# Patient Record
Sex: MALE | Race: WHITE | NOT HISPANIC OR LATINO | Employment: OTHER | URBAN - METROPOLITAN AREA
[De-identification: names, ages, dates, MRNs, and addresses within clinical notes are randomized per-mention and may not be internally consistent; named-entity substitution may affect disease eponyms.]

---

## 2017-01-23 ENCOUNTER — TRANSCRIBE ORDERS (OUTPATIENT)
Dept: ADMINISTRATIVE | Facility: HOSPITAL | Age: 77
End: 2017-01-23

## 2017-01-23 ENCOUNTER — LAB (OUTPATIENT)
Dept: LAB | Facility: HOSPITAL | Age: 77
End: 2017-01-23
Attending: INTERNAL MEDICINE

## 2017-01-23 ENCOUNTER — LAB (OUTPATIENT)
Dept: LAB | Facility: HOSPITAL | Age: 77
End: 2017-01-23

## 2017-01-23 DIAGNOSIS — E11.9 DIABETES MELLITUS WITHOUT COMPLICATION (HCC): ICD-10-CM

## 2017-01-23 DIAGNOSIS — E29.0 TESTICULAR HYPERFUNCTION: ICD-10-CM

## 2017-01-23 DIAGNOSIS — I10 ESSENTIAL HYPERTENSION, MALIGNANT: Primary | ICD-10-CM

## 2017-01-23 DIAGNOSIS — E78.5 HYPERLIPIDEMIA, UNSPECIFIED HYPERLIPIDEMIA TYPE: ICD-10-CM

## 2017-01-23 DIAGNOSIS — I10 ESSENTIAL HYPERTENSION, MALIGNANT: ICD-10-CM

## 2017-01-23 DIAGNOSIS — E03.9 UNSPECIFIED HYPOTHYROIDISM: ICD-10-CM

## 2017-01-23 DIAGNOSIS — G47.30 SLEEP APNEA, UNSPECIFIED TYPE: ICD-10-CM

## 2017-01-23 DIAGNOSIS — G47.30 SLEEP APNEA, UNSPECIFIED TYPE: Primary | ICD-10-CM

## 2017-01-23 LAB
25(OH)D3 SERPL-MCNC: 31.7 NG/ML (ref 30–100)
ALBUMIN SERPL-MCNC: 4.1 G/DL (ref 3.5–5.2)
ALBUMIN/GLOB SERPL: 1.2 G/DL
ALP SERPL-CCNC: 60 U/L (ref 39–117)
ALT SERPL W P-5'-P-CCNC: 24 U/L (ref 1–41)
ANION GAP SERPL CALCULATED.3IONS-SCNC: 13.3 MMOL/L
AST SERPL-CCNC: 31 U/L (ref 1–40)
BASOPHILS # BLD AUTO: 0.06 10*3/MM3 (ref 0–0.2)
BASOPHILS NFR BLD AUTO: 0.6 % (ref 0–1.5)
BILIRUB SERPL-MCNC: 1.1 MG/DL (ref 0.1–1.2)
BUN BLD-MCNC: 46 MG/DL (ref 8–23)
BUN/CREAT SERPL: 26.1 (ref 7–25)
CALCIUM SPEC-SCNC: 9.4 MG/DL (ref 8.6–10.5)
CHLORIDE SERPL-SCNC: 100 MMOL/L (ref 98–107)
CHOLEST SERPL-MCNC: 139 MG/DL (ref 0–200)
CO2 SERPL-SCNC: 26.7 MMOL/L (ref 22–29)
CREAT BLD-MCNC: 1.76 MG/DL (ref 0.76–1.27)
CREAT UR-MCNC: 48.6 MG/DL
DEPRECATED RDW RBC AUTO: 57.2 FL (ref 37–54)
EOSINOPHIL # BLD AUTO: 0.34 10*3/MM3 (ref 0–0.7)
EOSINOPHIL NFR BLD AUTO: 3.3 % (ref 0.3–6.2)
ERYTHROCYTE [DISTWIDTH] IN BLOOD BY AUTOMATED COUNT: 15.2 % (ref 11.5–14.5)
GFR SERPL CREATININE-BSD FRML MDRD: 38 ML/MIN/1.73
GLOBULIN UR ELPH-MCNC: 3.4 GM/DL
GLUCOSE BLD-MCNC: 237 MG/DL (ref 65–99)
HBA1C MFR BLD: 8.46 % (ref 4.8–5.6)
HCT VFR BLD AUTO: 48.9 % (ref 40.4–52.2)
HDLC SERPL-MCNC: 38 MG/DL (ref 40–60)
HGB BLD-MCNC: 15.1 G/DL (ref 13.7–17.6)
IMM GRANULOCYTES # BLD: 0.05 10*3/MM3 (ref 0–0.03)
IMM GRANULOCYTES NFR BLD: 0.5 % (ref 0–0.5)
LDLC SERPL CALC-MCNC: 69 MG/DL (ref 0–100)
LDLC/HDLC SERPL: 1.81 {RATIO}
LYMPHOCYTES # BLD AUTO: 1.2 10*3/MM3 (ref 0.9–4.8)
LYMPHOCYTES NFR BLD AUTO: 11.7 % (ref 19.6–45.3)
MCH RBC QN AUTO: 31.5 PG (ref 27–32.7)
MCHC RBC AUTO-ENTMCNC: 30.9 G/DL (ref 32.6–36.4)
MCV RBC AUTO: 101.9 FL (ref 79.8–96.2)
MONOCYTES # BLD AUTO: 0.89 10*3/MM3 (ref 0.2–1.2)
MONOCYTES NFR BLD AUTO: 8.7 % (ref 5–12)
NEUTROPHILS # BLD AUTO: 7.74 10*3/MM3 (ref 1.9–8.1)
NEUTROPHILS NFR BLD AUTO: 75.2 % (ref 42.7–76)
PHOSPHATE SERPL-MCNC: 4 MG/DL (ref 2.5–4.5)
PLATELET # BLD AUTO: 214 10*3/MM3 (ref 140–500)
PMV BLD AUTO: 10.1 FL (ref 6–12)
POTASSIUM BLD-SCNC: 5.2 MMOL/L (ref 3.5–5.2)
PROT SERPL-MCNC: 7.5 G/DL (ref 6–8.5)
PROT UR-MCNC: 9 MG/DL
PROT/CREAT UR: 185.2 MG/G CREA
RBC # BLD AUTO: 4.8 10*6/MM3 (ref 4.6–6)
SODIUM BLD-SCNC: 140 MMOL/L (ref 136–145)
TESTOST SERPL-MCNC: 152.7 NG/DL (ref 193–740)
TRIGL SERPL-MCNC: 161 MG/DL (ref 0–150)
TSH SERPL DL<=0.05 MIU/L-ACNC: 3.35 MIU/ML (ref 0.27–4.2)
VLDLC SERPL-MCNC: 32.2 MG/DL (ref 5–40)
WBC NRBC COR # BLD: 10.28 10*3/MM3 (ref 4.5–10.7)

## 2017-01-23 PROCEDURE — 84100 ASSAY OF PHOSPHORUS: CPT | Performed by: INTERNAL MEDICINE

## 2017-01-23 PROCEDURE — 84443 ASSAY THYROID STIM HORMONE: CPT | Performed by: INTERNAL MEDICINE

## 2017-01-23 PROCEDURE — 36415 COLL VENOUS BLD VENIPUNCTURE: CPT

## 2017-01-23 PROCEDURE — 82570 ASSAY OF URINE CREATININE: CPT | Performed by: INTERNAL MEDICINE

## 2017-01-23 PROCEDURE — 82306 VITAMIN D 25 HYDROXY: CPT | Performed by: INTERNAL MEDICINE

## 2017-01-23 PROCEDURE — 85025 COMPLETE CBC W/AUTO DIFF WBC: CPT | Performed by: INTERNAL MEDICINE

## 2017-01-23 PROCEDURE — 83036 HEMOGLOBIN GLYCOSYLATED A1C: CPT | Performed by: INTERNAL MEDICINE

## 2017-01-23 PROCEDURE — 80053 COMPREHEN METABOLIC PANEL: CPT | Performed by: INTERNAL MEDICINE

## 2017-01-23 PROCEDURE — 80061 LIPID PANEL: CPT | Performed by: INTERNAL MEDICINE

## 2017-01-23 PROCEDURE — 84403 ASSAY OF TOTAL TESTOSTERONE: CPT | Performed by: INTERNAL MEDICINE

## 2017-01-23 PROCEDURE — 84156 ASSAY OF PROTEIN URINE: CPT | Performed by: INTERNAL MEDICINE

## 2017-01-30 ENCOUNTER — OFFICE VISIT (OUTPATIENT)
Dept: NEUROSURGERY | Facility: CLINIC | Age: 77
End: 2017-01-30

## 2017-01-30 VITALS
HEIGHT: 71 IN | WEIGHT: 197 LBS | DIASTOLIC BLOOD PRESSURE: 79 MMHG | HEART RATE: 87 BPM | BODY MASS INDEX: 27.58 KG/M2 | SYSTOLIC BLOOD PRESSURE: 122 MMHG

## 2017-01-30 DIAGNOSIS — M48.061 STENOSIS, SPINAL, LUMBAR: Primary | ICD-10-CM

## 2017-01-30 DIAGNOSIS — M51.36 DEGENERATION OF LUMBAR INTERVERTEBRAL DISC: ICD-10-CM

## 2017-01-30 PROCEDURE — 99213 OFFICE O/P EST LOW 20 MIN: CPT | Performed by: NEUROLOGICAL SURGERY

## 2017-01-30 NOTE — PROGRESS NOTES
Subjective   Patient ID: Eugenio Vargas is a 76 y.o. male is here today for follow-up. Patient has been having increasing back pain. He would like to have some epidurals. He is not currently taking any pain medication.    Back Pain   This is a recurrent problem. The current episode started more than 1 year ago. The problem occurs constantly. The problem has been gradually worsening since onset. The pain is present in the lumbar spine. The quality of the pain is described as aching. The pain radiates to the left knee, left thigh, right knee and right thigh. The pain is at a severity of 3/10. The pain is mild. The pain is the same all the time. The symptoms are aggravated by bending and standing. Associated symptoms include leg pain and weakness. Pertinent negatives include no bladder incontinence, bowel incontinence, numbness or tingling. Treatments tried: HANNAH. The treatment provided significant relief.       The following portions of the patient's history were reviewed and updated as appropriate: allergies, current medications, past family history, past medical history, past social history, past surgical history and problem list.    Review of Systems   Constitutional: Positive for activity change.   Gastrointestinal: Negative for bowel incontinence.   Genitourinary: Negative for bladder incontinence.   Musculoskeletal: Positive for back pain.   Neurological: Positive for weakness. Negative for tingling and numbness.   All other systems reviewed and are negative.    He is with his wife.  I have not seen him for about 15 or 16 months.  He has known spinal stenosis with low back pain and right leg pain.  The last set of blocks helped him.  The blocks have worn off.  His pain has flared up.  He is on aspirin.  He wants to try the blocks again.  His symptoms are consistent.  He has no motor deficits.  I think it's reasonable to reorder the blocks without reimaging him.  We'll go ahead and set that  out.        Objective   Physical Exam   Constitutional: He is oriented to person, place, and time. He appears well-developed and well-nourished.   HENT:   Head: Normocephalic and atraumatic.   Eyes: Conjunctivae and EOM are normal. Pupils are equal, round, and reactive to light.   Fundoscopic exam:       The right eye shows no papilledema. The right eye shows venous pulsations.        The left eye shows no papilledema. The left eye shows venous pulsations.   Neck: Carotid bruit is not present.   Neurological: He is oriented to person, place, and time. He has a normal Finger-Nose-Finger Test and a normal Heel to Shin Test. Gait normal.   Reflex Scores:       Tricep reflexes are 2+ on the right side and 2+ on the left side.       Bicep reflexes are 2+ on the right side and 2+ on the left side.       Brachioradialis reflexes are 2+ on the right side and 2+ on the left side.       Patellar reflexes are 2+ on the right side and 2+ on the left side.       Achilles reflexes are 2+ on the right side and 2+ on the left side.  Psychiatric: His speech is normal.     Neurologic Exam     Mental Status   Oriented to person, place, and time.   Registration of memory: Good recent and remote memory.   Attention: normal. Concentration: normal.   Speech: speech is normal   Level of consciousness: alert  Knowledge: consistent with education.     Cranial Nerves     CN II   Visual fields full to confrontation.   Visual acuity: normal    CN III, IV, VI   Pupils are equal, round, and reactive to light.  Extraocular motions are normal.     CN V   Facial sensation intact.   Right corneal reflex: normal  Left corneal reflex: normal    CN VII   Facial expression full, symmetric.   Right facial weakness: none  Left facial weakness: none    CN VIII   Hearing: intact    CN IX, X   Palate: symmetric    CN XI   Right sternocleidomastoid strength: normal  Left sternocleidomastoid strength: normal    CN XII   Tongue: not atrophic  Tongue deviation:  none    Motor Exam   Muscle bulk: normal  Right arm tone: normal  Left arm tone: normal  Right leg tone: normal  Left leg tone: normal    Strength   Strength 5/5 except as noted.     Sensory Exam   Light touch normal.     Gait, Coordination, and Reflexes     Gait  Gait: normal    Coordination   Finger to nose coordination: normal  Heel to shin coordination: normal    Reflexes   Right brachioradialis: 2+  Left brachioradialis: 2+  Right biceps: 2+  Left biceps: 2+  Right triceps: 2+  Left triceps: 2+  Right patellar: 2+  Left patellar: 2+  Right achilles: 2+  Left achilles: 2+  Right : 2+  Left : 2+      Assessment/Plan   Independent Review of Radiographic Studies:      Medical Decision Making:    We'll go ahead and repeat blocks again and have him come back in 4 months.  If these don't work, we'll need to obtain a new MRI and regroup.      Eugenio was seen today for back pain.    Diagnoses and all orders for this visit:    Stenosis, spinal, lumbar  -     Epidural Block    Degeneration of lumbar intervertebral disc  -     Epidural Block    Return in about 4 months (around 5/30/2017).

## 2017-01-30 NOTE — MR AVS SNAPSHOT
Eugenio Vargas   1/30/2017 12:00 PM   Office Visit    Dept Phone:  [phone]   Encounter #:  [id number]    Provider:  Jose M Gutierrez MD   Department:  [facility] NEUROSURGERY                Your Full Care Plan              Today's Medication Changes          These changes are accurate as of: 1/30/17  2:23 PM.  If you have any questions, ask your nurse or doctor.               Medication(s)that have changed:     acyclovir 800 MG tablet   Commonly known as:  ZOVIRAX   TK 1 T PO  FIVE TIMES DAILY FOR 7 DAYS   What changed:  Another medication with the same name was removed. Continue taking this medication, and follow the directions you see here.   Changed by:  Lalitha Brandon MD       ALDACTONE 25 MG tablet   Generic drug:  spironolactone   Take 25 mg by mouth.   What changed:  Another medication with the same name was removed. Continue taking this medication, and follow the directions you see here.   Changed by:  Lalitha Brandon MD       ALPRAZolam 0.25 MG tablet   Commonly known as:  XANAX   Daily.   What changed:  Another medication with the same name was removed. Continue taking this medication, and follow the directions you see here.   Changed by:  Lalitha Brandon MD       amiodarone 200 MG tablet   Commonly known as:  PACERONE   Take 200 mg by mouth.   What changed:  Another medication with the same name was removed. Continue taking this medication, and follow the directions you see here.   Changed by:  Lalitha Brandon MD       ANORO ELLIPTA 62.5-25 MCG/INH aerosol powder    Generic drug:  Umeclidinium-Vilanterol   TAKE 1 INHALATION D   What changed:  Another medication with the same name was removed. Continue taking this medication, and follow the directions you see here.   Changed by:  Lalitha Brandon MD       aspirin 81 MG EC tablet   Take  by mouth daily.   What changed:  Another medication with the same name was removed. Continue taking  this medication, and follow the directions you see here.   Changed by:  Lalitha Brandon MD       carvedilol 12.5 MG tablet   Commonly known as:  COREG   TAKE ONE AND ONE-HALF TABLETS BY MOUTH TWICE DAILY AS DIRECTED   What changed:  Another medication with the same name was removed. Continue taking this medication, and follow the directions you see here.   Changed by:  Lalitha Brandon MD       levothyroxine 100 MCG tablet   Commonly known as:  SYNTHROID, LEVOTHROID   Take 100 mcg by mouth.   What changed:  Another medication with the same name was removed. Continue taking this medication, and follow the directions you see here.   Changed by:  Lalitha Brandon MD       torsemide 20 MG tablet   Commonly known as:  DEMADEX   Take 20 mg by mouth.   What changed:  Another medication with the same name was removed. Continue taking this medication, and follow the directions you see here.   Changed by:  Lalitha Brandon MD         Stop taking medication(s)listed here:     cephalexin 500 MG capsule   Commonly known as:  KEFLEX   Stopped by:  Jose M Gutierrez MD           CITRACAL PLUS tablet   Stopped by:  Jose M Gutierrez MD           fish oil 1000 MG capsule capsule   Stopped by:  Jose M Gutierrez MD           HYDROcodone-acetaminophen 7.5-325 MG per tablet   Commonly known as:  NORCO   Stopped by:  Jose M Gutierrez MD           isosorbide dinitrate 20 MG tablet 1 tablet, hydrALAZINE 25 MG tablet 1.5 tablet   Stopped by:  Jose M Gutierrez MD           levoFLOXacin 500 MG tablet   Commonly known as:  LEVAQUIN   Stopped by:  Jose M Gutierrez MD           lidocaine 2 % jelly   Commonly known as:  XYLOCAINE   Stopped by:  Jose M Gutierrez MD           predniSONE 20 MG tablet   Commonly known as:  DELTASONE   Stopped by:  Jose M Gutierrez MD                      Your Updated Medication List          This list is accurate as of: 1/30/17  2:23 PM.  Always use your most recent med list.                 acyclovir 800 MG tablet   Commonly known as:  ZOVIRAX       ALDACTONE 25 MG tablet   Generic drug:  spironolactone       ALPRAZolam 0.25 MG tablet   Commonly known as:  XANAX       amiodarone 200 MG tablet   Commonly known as:  PACERONE       ANORO ELLIPTA 62.5-25 MCG/INH aerosol powder    Generic drug:  Umeclidinium-Vilanterol       aspirin 81 MG EC tablet       BIDIL 20-37.5 MG per tablet   Generic drug:  isosorbide-hydrALAZINE       carvedilol 12.5 MG tablet   Commonly known as:  COREG       insulin  UNIT/ML injection   Commonly known as:  humuLIN N,novoLIN N       levothyroxine 100 MCG tablet   Commonly known as:  SYNTHROID, LEVOTHROID       Memantine HCl-Donepezil HCl 28-10 MG capsule sustained-release 24 hr   Take 1 capsule by mouth Every Night for 90 days.       multivitamin tablet       rosuvastatin 10 MG tablet   Commonly known as:  CRESTOR       Testosterone Cypionate 200 MG/ML injection   Commonly known as:  DEPOTESTOTERONE CYPIONATE       torsemide 20 MG tablet   Commonly known as:  DEMADEX               We Performed the Following     Epidural Block       You Were Diagnosed With        Codes Comments    Stenosis, spinal, lumbar    -  Primary ICD-10-CM: M48.06  ICD-9-CM: 724.02     Degeneration of lumbar intervertebral disc     ICD-10-CM: M51.36  ICD-9-CM: 722.52       Instructions     None    Patient Instructions History      Upcoming Appointments     Visit Type Date Time Department    FOLLOW UP 1/30/2017 12:00 PM [facility]    FOLLOW UP 5/22/2017 10:00 AM [facility]    FOLLOW UP 6/2/2017  9:00 AM [facility]      [employer] Signup     [facility] [employer] allows you to send messages to your doctor, view your test results, renew your prescriptions, schedule appointments, and more. To sign up, go to [employer] and click on the Sign Up Now link in the New User? box. Enter your [employer] Activation Code exactly as it appears below along with the last four  "digits of your Social Security Number and your Date of Birth () to complete the sign-up process. If you do not sign up before the expiration date, you must request a new code.    [employer] Activation Code: [email or web address]  Expires: 2017  2:23 PM    If you have questions, you can email [email or web address] or call [phone] to talk to our [employer] staff. Remember, [employer] is NOT to be used for urgent needs. For medical emergencies, dial 911.               Other Info from Your Visit           Your Appointments     May 22, 2017 10:00 AM EDT   Follow Up with Lalitha Brandon MD   [facility] NEUROLOGY (--)    [address]  [place] [facility] [address] [phone]           Arrive 15 minutes prior to appointment.            2017  9:00 AM EDT   Follow Up with Jose M Gutierrez MD   [facility] NEUROSURGERY (--)    [address]  [facility] [address] [phone]           Arrive 15 minutes prior to appointment.              Allergies     Lorazepam        Reason for Visit     Back Pain           Vital Signs     Blood Pressure Pulse Height Weight Body Mass Index Smoking Status    122/79 87 71\" (180.3 cm) 197 lb (89.4 kg) 27.48 kg/m2 Former Smoker      Problems and Diagnoses Noted     Degeneration of lumbar or lumbosacral intervertebral disc    Stenosis, spinal, lumbar        "

## 2017-01-30 NOTE — LETTER
January 30, 2017     Juan Hurd MD  [address]  [address]  [facility] [place] [address]    Patient: Eugenio Vargas   YOB: 1940   Date of Visit: 1/30/2017       Dear Dr. Vickey MD:    Eugenio Vargas was in my office today. Below are the relevant portions of my assessment and plan of care.      Independent Review of Radiographic Studies:      Medical Decision Making:    We'll go ahead and repeat blocks again and have him come back in 4 months.  If these don't work, we'll need to obtain a new MRI and regroup.      Eugenio was seen today for back pain.    Diagnoses and all orders for this visit:    Stenosis, spinal, lumbar  -     Epidural Block    Degeneration of lumbar intervertebral disc  -     Epidural Block    Return in about 4 months (around 5/30/2017).            If you have questions, please do not hesitate to call me. I look forward to following Eugenio along with you.         Sincerely,        Jose M Gutierrez MD        CC: No Recipients

## 2017-02-10 ENCOUNTER — TELEPHONE (OUTPATIENT)
Dept: NEUROLOGY | Facility: CLINIC | Age: 77
End: 2017-02-10

## 2017-02-10 RX ORDER — DONEPEZIL HYDROCHLORIDE 10 MG/1
10 TABLET, FILM COATED ORAL NIGHTLY
Qty: 90 TABLET | Refills: 3 | Status: SHIPPED | OUTPATIENT
Start: 2017-02-10 | End: 2017-05-11

## 2017-02-10 NOTE — TELEPHONE ENCOUNTER
Namzaric is going to cost pt $700 for 1 month. Pt is needing a RX for Donepezil so he can go back to taking both and Namenda. Has enough Namenda until next appt.

## 2017-02-16 ENCOUNTER — HOSPITAL ENCOUNTER (OUTPATIENT)
Dept: PAIN MEDICINE | Facility: HOSPITAL | Age: 77
Discharge: HOME | End: 2017-02-16
Attending: NEUROLOGICAL SURGERY | Admitting: NEUROLOGICAL SURGERY

## 2017-02-16 ENCOUNTER — ANESTHESIA (OUTPATIENT)
Dept: PAIN MEDICINE | Facility: HOSPITAL | Age: 77
End: 2017-02-16

## 2017-02-16 ENCOUNTER — ANESTHESIA EVENT (OUTPATIENT)
Dept: PAIN MEDICINE | Facility: HOSPITAL | Age: 77
End: 2017-02-16

## 2017-02-16 ENCOUNTER — HOSPITAL ENCOUNTER (OUTPATIENT)
Dept: GENERAL RADIOLOGY | Facility: HOSPITAL | Age: 77
Discharge: HOME | End: 2017-02-16

## 2017-02-16 VITALS
OXYGEN SATURATION: 96 % | SYSTOLIC BLOOD PRESSURE: 122 MMHG | HEIGHT: 70 IN | RESPIRATION RATE: 16 BRPM | HEART RATE: 63 BPM | WEIGHT: 189 LBS | BODY MASS INDEX: 27.06 KG/M2 | TEMPERATURE: 97.8 F | DIASTOLIC BLOOD PRESSURE: 63 MMHG

## 2017-02-16 DIAGNOSIS — R52 PAIN: ICD-10-CM

## 2017-02-16 DIAGNOSIS — M48.061 STENOSIS, SPINAL, LUMBAR: ICD-10-CM

## 2017-02-16 DIAGNOSIS — M51.36 DEGENERATION OF LUMBAR INTERVERTEBRAL DISC: ICD-10-CM

## 2017-02-16 LAB — GLUCOSE BLDC GLUCOMTR-MCNC: 164 MG/DL (ref 70–130)

## 2017-02-16 PROCEDURE — 77003 FLUOROGUIDE FOR SPINE INJECT: CPT

## 2017-02-16 PROCEDURE — 82962 GLUCOSE BLOOD TEST: CPT

## 2017-02-16 PROCEDURE — C1755 CATHETER, INTRASPINAL: HCPCS

## 2017-02-16 PROCEDURE — 25010000002 METHYLPREDNISOLONE PER 80 MG: Performed by: ANESTHESIOLOGY

## 2017-02-16 RX ORDER — METHYLPREDNISOLONE ACETATE 80 MG/ML
80 INJECTION, SUSPENSION INTRA-ARTICULAR; INTRALESIONAL; INTRAMUSCULAR; SOFT TISSUE ONCE
Status: COMPLETED | OUTPATIENT
Start: 2017-02-16 | End: 2017-02-16

## 2017-02-16 RX ORDER — LIDOCAINE HYDROCHLORIDE 10 MG/ML
1 INJECTION, SOLUTION INFILTRATION; PERINEURAL ONCE
Status: DISCONTINUED | OUTPATIENT
Start: 2017-02-16 | End: 2017-02-17 | Stop reason: HOSPADM

## 2017-02-16 RX ADMIN — METHYLPREDNISOLONE ACETATE 80 MG: 80 INJECTION, SUSPENSION INTRA-ARTICULAR; INTRALESIONAL; INTRAMUSCULAR; SOFT TISSUE at 10:28

## 2017-02-16 NOTE — ANESTHESIA PROCEDURE NOTES
PAIN Epidural block    Patient location during procedure: pain clinic  Indication:procedure for pain  Performed By  Anesthesiologist: BALAJI SEQUEIRA  Preanesthetic Checklist  Completed: patient identified, site marked, surgical consent, pre-op evaluation, timeout performed, IV checked, risks and benefits discussed and monitors and equipment checked  Additional Notes  LSS/LRAD WITH FLUORO, NO DYE DUE TO KIDNEY FUNCTION  Epidural Block Prep:  Pt Position:prone  Sterile Tech:cap, gloves, mask and sterile barrier  Monitoring:blood pressure monitoring, continuous pulse oximetry and EKG  Epidural Block Procedure:  Approach:right paramedian  Guidance: fluoroscopy  Location:lumbar  Level:4-5  Needle Type:Tuohy  Needle Gauge:20  Aspiration:negative  Medications:  Depomedrol:80  Preservative Free Saline:3mL    Post Assessment:  Dressing:secured with tape  Pt Tolerance:patient tolerated the procedure well with no apparent complications  Complications:no

## 2017-02-16 NOTE — H&P
[facility]    History and Physical    Patient Name: Eugenio Vargas  :  1940  MRN:  [id number]  Date of Admission: 2017    Subjective     Patient is a 77 y.o. male presents with chief complaint of chronic low back pain.  Onset of symptoms was gradual starting several years ago.  Symptoms are associated/aggravated by standing. Symptoms improve with lying down.  Pain 7/10 radiate to hip.    The following portions of the patients history were reviewed and updated as appropriate: current medications, allergies, past medical history, past surgical history, past family history, past social history and problem list                Objective     Past Medical History:   Past Medical History   Diagnosis Date   • Anxiety    • Arthritis    • Atrial tachycardia, paroxysmal    • CAD (coronary artery disease)    • CHF (congestive heart failure)    • CKD (chronic kidney disease)    • COPD (chronic obstructive pulmonary disease)    • DDD (degenerative disc disease), lumbar    • Diabetes mellitus    • History of positive PPD      pt states positive reactor, but never diagnosed/treated for TB   • HLD (hyperlipidemia)    • HTN (hypertension)    • Hypothyroid    • Ischemic cardiomyopathy    • Mitral insufficiency    • SASHA (obstructive sleep apnea)    • PAF (paroxysmal atrial fibrillation)    • Spinal stenosis    • Stroke      STROKE OCCURED DURING CARDIAC SURGERY IN    • V-tach      Paroxysmal     Past Surgical History:   Past Surgical History   Procedure Laterality Date   • Shoulder surgery     • Sinus surgery     • Mitral valve annuloplasty     • Coronary angioplasty with stent placement     • Cholecystectomy     • Coronary artery bypass graft     • Eye surgery       Family History:   Family History   Problem Relation Age of Onset   • Family history unknown: Yes     Social History:   Social History   Substance Use Topics   • Smoking status: Former Smoker     Packs/day: 1.00   • Smokeless tobacco: Never Used       "Comment: PT QUIT 45 YEARS AGO   • Alcohol use No       Vital Signs Range for the last 24 hours  Temperature: Temp:  [36.6 °C (97.8 °F)] 36.6 °C (97.8 °F)   Temp Source: Temp src: Oral   BP: BP: (134)/(91) 134/91   Pulse: Heart Rate:  [82] 82   Respirations: Resp:  [16] 16   SPO2: SpO2:  [95 %] 95 %   O2 Amount (l/min):     O2 Devices O2 Device: room air   Weight: Weight:  [189 lb (85.7 kg)] 189 lb (85.7 kg)     Flowsheet Rows         First Filed Value    Admission Height  70\" (177.8 cm) Documented at 02/16/2017 1001    Admission Weight  189 lb (85.7 kg) Documented at 02/16/2017 1001          --------------------------------------------------------------------------------    Current Outpatient Prescriptions   Medication Sig Dispense Refill   • ALPRAZolam (XANAX) 0.25 MG tablet Daily.     • amiodarone (PACERONE) 200 MG tablet Take 200 mg by mouth.     • ANORO ELLIPTA 62.5-25 MCG/INH aerosol powder  TAKE 1 INHALATION D  5   • aspirin 81 MG EC tablet Take  by mouth daily.     • carvedilol (COREG) 12.5 MG tablet TAKE ONE AND ONE-HALF TABLETS BY MOUTH TWICE DAILY AS DIRECTED     • donepezil (ARICEPT) 10 MG tablet Take 1 tablet by mouth Every Night for 90 days. 90 tablet 3   • insulin NPH (HumuLIN,NovoLIN) 100 UNIT/ML injection Inject 14 Units under the skin 2 (two) times a day before meals.     • levothyroxine (SYNTHROID, LEVOTHROID) 100 MCG tablet Take 100 mcg by mouth.     • Multiple Vitamin (MULTIVITAMIN) tablet Take 1 tablet by mouth.     • rosuvastatin (CRESTOR) 10 MG tablet Take  by mouth.     • spironolactone (ALDACTONE) 25 MG tablet Take 25 mg by mouth.     • Testosterone Cypionate (DEPOTESTOTERONE CYPIONATE) 200 MG/ML injection 200 mg.     • torsemide (DEMADEX) 20 MG tablet Take 20 mg by mouth.     • BIDIL 20-37.5 MG per tablet TK 1/2 T PO BID  4     No current facility-administered medications for this encounter.  "       --------------------------------------------------------------------------------  Assessment/Plan      Anesthesia Evaluation     Patient summary reviewed and Nursing notes reviewed      Airway   Mallampati: II  TM distance: >3 FB  Neck ROM: full  no difficulty expected  Dental - normal exam     Pulmonary     breath sounds clear to auscultation  (+) COPD, sleep apnea,   Cardiovascular - normal exam  Exercise tolerance: good (4-7 METS)    Rhythm: regular  Rate: normal    (+) hypertension, past MI , CAD, CABG, dysrhythmias Atrial Fib, CHF,       Neuro/Psych- neuro exam normal  (+) CVA, psychiatric history Anxiety,    GI/Hepatic/Renal/Endo    (+)  chronic renal disease CRI, diabetes mellitus well controlled, hypothyroidism,     Musculoskeletal (-) normal exam    (+) back pain,   Abdominal  - normal exam   Substance History - negative use     OB/GYN          Other   (+) arthritis                            Diagnosis and Plan    Treatment Plan  ASA 3      Procedures: Lumbar Epidural Steroid Injection(LESI), With fluoroscopy,       Anesthetic plan and risks discussed with patient.          Diagnosis     * Lumbar spinal stenosis [M48.06]     * Lumbar degenerative disc disease [M51.36]

## 2017-02-28 ENCOUNTER — HOSPITAL ENCOUNTER (OUTPATIENT)
Facility: HOSPITAL | Age: 77
Setting detail: OBSERVATION
Discharge: HOME | End: 2017-03-01
Attending: EMERGENCY MEDICINE | Admitting: INTERNAL MEDICINE

## 2017-02-28 ENCOUNTER — APPOINTMENT (OUTPATIENT)
Dept: CT IMAGING | Facility: HOSPITAL | Age: 77
End: 2017-02-28

## 2017-02-28 DIAGNOSIS — R74.8 ELEVATED LIPASE: ICD-10-CM

## 2017-02-28 DIAGNOSIS — E87.1 HYPONATREMIA: ICD-10-CM

## 2017-02-28 DIAGNOSIS — N28.9 ACUTE ON CHRONIC RENAL INSUFFICIENCY: Primary | ICD-10-CM

## 2017-02-28 DIAGNOSIS — R73.9 HYPERGLYCEMIA: ICD-10-CM

## 2017-02-28 DIAGNOSIS — E86.0 DEHYDRATION: ICD-10-CM

## 2017-02-28 DIAGNOSIS — N18.9 ACUTE ON CHRONIC RENAL INSUFFICIENCY: Primary | ICD-10-CM

## 2017-02-28 PROBLEM — E11.9 DIABETES MELLITUS (HCC): Status: ACTIVE | Noted: 2017-02-28

## 2017-02-28 PROBLEM — R19.7 DIARRHEA: Status: ACTIVE | Noted: 2017-02-28

## 2017-02-28 LAB
ALBUMIN SERPL-MCNC: 4 G/DL (ref 3.5–5.2)
ALBUMIN/GLOB SERPL: 1.2 G/DL
ALP SERPL-CCNC: 74 U/L (ref 39–117)
ALT SERPL W P-5'-P-CCNC: 44 U/L (ref 1–41)
ANION GAP SERPL CALCULATED.3IONS-SCNC: 19.4 MMOL/L
AST SERPL-CCNC: 28 U/L (ref 1–40)
BASOPHILS # BLD AUTO: 0.03 10*3/MM3 (ref 0–0.2)
BASOPHILS NFR BLD AUTO: 0.2 % (ref 0–1.5)
BILIRUB SERPL-MCNC: 0.7 MG/DL (ref 0.1–1.2)
BILIRUB UR QL STRIP: NEGATIVE
BUN BLD-MCNC: 98 MG/DL (ref 8–23)
BUN/CREAT SERPL: 34.5 (ref 7–25)
C DIFF TOX GENS STL QL NAA+PROBE: NEGATIVE
CALCIUM SPEC-SCNC: 8.6 MG/DL (ref 8.6–10.5)
CHLORIDE SERPL-SCNC: 93 MMOL/L (ref 98–107)
CLARITY UR: ABNORMAL
CO2 SERPL-SCNC: 18.6 MMOL/L (ref 22–29)
COLOR UR: YELLOW
CREAT BLD-MCNC: 2.84 MG/DL (ref 0.76–1.27)
DEPRECATED RDW RBC AUTO: 48.1 FL (ref 37–54)
EOSINOPHIL # BLD AUTO: 0.75 10*3/MM3 (ref 0–0.7)
EOSINOPHIL NFR BLD AUTO: 5.6 % (ref 0.3–6.2)
ERYTHROCYTE [DISTWIDTH] IN BLOOD BY AUTOMATED COUNT: 14.2 % (ref 11.5–14.5)
GFR SERPL CREATININE-BSD FRML MDRD: 22 ML/MIN/1.73
GLOBULIN UR ELPH-MCNC: 3.3 GM/DL
GLUCOSE BLD-MCNC: 249 MG/DL (ref 65–99)
GLUCOSE BLDC GLUCOMTR-MCNC: 142 MG/DL (ref 70–130)
GLUCOSE UR STRIP-MCNC: NEGATIVE MG/DL
HCT VFR BLD AUTO: 51.8 % (ref 40.4–52.2)
HGB BLD-MCNC: 17.6 G/DL (ref 13.7–17.6)
HGB UR QL STRIP.AUTO: NEGATIVE
HOLD SPECIMEN: NORMAL
HOLD SPECIMEN: NORMAL
IMM GRANULOCYTES # BLD: 0.14 10*3/MM3 (ref 0–0.03)
IMM GRANULOCYTES NFR BLD: 1 % (ref 0–0.5)
KETONES UR QL STRIP: NEGATIVE
LEUKOCYTE ESTERASE UR QL STRIP.AUTO: NEGATIVE
LIPASE SERPL-CCNC: 82 U/L (ref 13–60)
LYMPHOCYTES # BLD AUTO: 0.84 10*3/MM3 (ref 0.9–4.8)
LYMPHOCYTES NFR BLD AUTO: 6.2 % (ref 19.6–45.3)
MCH RBC QN AUTO: 31.5 PG (ref 27–32.7)
MCHC RBC AUTO-ENTMCNC: 34 G/DL (ref 32.6–36.4)
MCV RBC AUTO: 92.7 FL (ref 79.8–96.2)
MONOCYTES # BLD AUTO: 1.25 10*3/MM3 (ref 0.2–1.2)
MONOCYTES NFR BLD AUTO: 9.3 % (ref 5–12)
NEUTROPHILS # BLD AUTO: 10.47 10*3/MM3 (ref 1.9–8.1)
NEUTROPHILS NFR BLD AUTO: 77.7 % (ref 42.7–76)
NITRITE UR QL STRIP: NEGATIVE
PH UR STRIP.AUTO: <=5 [PH] (ref 5–8)
PLATELET # BLD AUTO: 238 10*3/MM3 (ref 140–500)
PMV BLD AUTO: 9.6 FL (ref 6–12)
POTASSIUM BLD-SCNC: 4.5 MMOL/L (ref 3.5–5.2)
PROT SERPL-MCNC: 7.3 G/DL (ref 6–8.5)
PROT UR QL STRIP: NEGATIVE
RBC # BLD AUTO: 5.59 10*6/MM3 (ref 4.6–6)
SODIUM BLD-SCNC: 131 MMOL/L (ref 136–145)
SP GR UR STRIP: 1.02 (ref 1–1.03)
UROBILINOGEN UR QL STRIP: ABNORMAL
WBC NRBC COR # BLD: 13.48 10*3/MM3 (ref 4.5–10.7)
WHOLE BLOOD HOLD SPECIMEN: NORMAL
WHOLE BLOOD HOLD SPECIMEN: NORMAL

## 2017-02-28 PROCEDURE — G0378 HOSPITAL OBSERVATION PER HR: HCPCS

## 2017-02-28 PROCEDURE — 96372 THER/PROPH/DIAG INJ SC/IM: CPT

## 2017-02-28 PROCEDURE — 83690 ASSAY OF LIPASE: CPT | Performed by: EMERGENCY MEDICINE

## 2017-02-28 PROCEDURE — 25010000002 HEPARIN (PORCINE) PER 1000 UNITS: Performed by: INTERNAL MEDICINE

## 2017-02-28 PROCEDURE — 80053 COMPREHEN METABOLIC PANEL: CPT | Performed by: EMERGENCY MEDICINE

## 2017-02-28 PROCEDURE — 82962 GLUCOSE BLOOD TEST: CPT

## 2017-02-28 PROCEDURE — 99283 EMERGENCY DEPT VISIT LOW MDM: CPT

## 2017-02-28 PROCEDURE — 85025 COMPLETE CBC W/AUTO DIFF WBC: CPT | Performed by: EMERGENCY MEDICINE

## 2017-02-28 PROCEDURE — 87045 FECES CULTURE AEROBIC BACT: CPT | Performed by: EMERGENCY MEDICINE

## 2017-02-28 PROCEDURE — 87493 C DIFF AMPLIFIED PROBE: CPT | Performed by: EMERGENCY MEDICINE

## 2017-02-28 PROCEDURE — 81003 URINALYSIS AUTO W/O SCOPE: CPT | Performed by: EMERGENCY MEDICINE

## 2017-02-28 PROCEDURE — 25010000002 DICYCLOMINE PER 20 MG: Performed by: EMERGENCY MEDICINE

## 2017-02-28 PROCEDURE — 96360 HYDRATION IV INFUSION INIT: CPT

## 2017-02-28 PROCEDURE — 87046 STOOL CULTR AEROBIC BACT EA: CPT | Performed by: EMERGENCY MEDICINE

## 2017-02-28 PROCEDURE — 63710000001 INSULIN ISOPHANE HUMAN PER 5 UNITS: Performed by: INTERNAL MEDICINE

## 2017-02-28 PROCEDURE — 96361 HYDRATE IV INFUSION ADD-ON: CPT

## 2017-02-28 PROCEDURE — 94640 AIRWAY INHALATION TREATMENT: CPT

## 2017-02-28 PROCEDURE — 74176 CT ABD & PELVIS W/O CONTRAST: CPT

## 2017-02-28 RX ORDER — ACETAMINOPHEN 500 MG
500 TABLET ORAL EVERY 6 HOURS PRN
Status: DISCONTINUED | OUTPATIENT
Start: 2017-02-28 | End: 2017-02-28 | Stop reason: SDUPTHER

## 2017-02-28 RX ORDER — LANOLIN ALCOHOL/MO/W.PET/CERES
1000 CREAM (GRAM) TOPICAL DAILY
COMMUNITY

## 2017-02-28 RX ORDER — SODIUM CHLORIDE 0.9 % (FLUSH) 0.9 %
10 SYRINGE (ML) INJECTION AS NEEDED
Status: DISCONTINUED | OUTPATIENT
Start: 2017-02-28 | End: 2017-03-01 | Stop reason: HOSPADM

## 2017-02-28 RX ORDER — ALPRAZOLAM 0.25 MG/1
0.25 TABLET ORAL 3 TIMES DAILY PRN
Status: DISCONTINUED | OUTPATIENT
Start: 2017-02-28 | End: 2017-03-01 | Stop reason: HOSPADM

## 2017-02-28 RX ORDER — ACETAMINOPHEN 500 MG
500 TABLET ORAL EVERY 6 HOURS PRN
COMMUNITY

## 2017-02-28 RX ORDER — ISOSORBIDE DINITRATE AND HYDRALAZINE HYDROCHLORIDE 37.5; 2 MG/1; MG/1
0.5 TABLET ORAL EVERY 12 HOURS SCHEDULED
Status: DISCONTINUED | OUTPATIENT
Start: 2017-02-28 | End: 2017-03-01 | Stop reason: HOSPADM

## 2017-02-28 RX ORDER — DIPHENHYDRAMINE HCL 25 MG
25 CAPSULE ORAL NIGHTLY PRN
Status: DISCONTINUED | OUTPATIENT
Start: 2017-02-28 | End: 2017-03-01 | Stop reason: HOSPADM

## 2017-02-28 RX ORDER — DIPHENOXYLATE HYDROCHLORIDE AND ATROPINE SULFATE 2.5; .025 MG/1; MG/1
1 TABLET ORAL DAILY
Status: DISCONTINUED | OUTPATIENT
Start: 2017-02-28 | End: 2017-03-01 | Stop reason: HOSPADM

## 2017-02-28 RX ORDER — DONEPEZIL HYDROCHLORIDE 10 MG/1
10 TABLET, FILM COATED ORAL NIGHTLY
Status: DISCONTINUED | OUTPATIENT
Start: 2017-02-28 | End: 2017-03-01 | Stop reason: HOSPADM

## 2017-02-28 RX ORDER — DEXTROSE MONOHYDRATE 25 G/50ML
25 INJECTION, SOLUTION INTRAVENOUS
Status: DISCONTINUED | OUTPATIENT
Start: 2017-02-28 | End: 2017-03-01 | Stop reason: HOSPADM

## 2017-02-28 RX ORDER — ASPIRIN 81 MG/1
81 TABLET ORAL DAILY
Status: DISCONTINUED | OUTPATIENT
Start: 2017-02-28 | End: 2017-03-01 | Stop reason: HOSPADM

## 2017-02-28 RX ORDER — MEMANTINE HYDROCHLORIDE 10 MG/1
10 TABLET ORAL DAILY
COMMUNITY
End: 2017-08-16 | Stop reason: SDUPTHER

## 2017-02-28 RX ORDER — AMIODARONE HYDROCHLORIDE 200 MG/1
200 TABLET ORAL DAILY
Status: DISCONTINUED | OUTPATIENT
Start: 2017-02-28 | End: 2017-03-01 | Stop reason: HOSPADM

## 2017-02-28 RX ORDER — DICYCLOMINE HYDROCHLORIDE 10 MG/ML
20 INJECTION INTRAMUSCULAR ONCE
Status: COMPLETED | OUTPATIENT
Start: 2017-02-28 | End: 2017-02-28

## 2017-02-28 RX ORDER — TESTOSTERONE CYPIONATE 200 MG/ML
200 INJECTION, SOLUTION INTRAMUSCULAR
Status: DISCONTINUED | OUTPATIENT
Start: 2017-03-01 | End: 2017-03-01 | Stop reason: HOSPADM

## 2017-02-28 RX ORDER — ONDANSETRON 2 MG/ML
4 INJECTION INTRAMUSCULAR; INTRAVENOUS EVERY 6 HOURS PRN
Status: DISCONTINUED | OUTPATIENT
Start: 2017-02-28 | End: 2017-03-01 | Stop reason: HOSPADM

## 2017-02-28 RX ORDER — CHOLECALCIFEROL (VITAMIN D3) 125 MCG
1000 CAPSULE ORAL DAILY
Status: DISCONTINUED | OUTPATIENT
Start: 2017-02-28 | End: 2017-03-01 | Stop reason: HOSPADM

## 2017-02-28 RX ORDER — CARVEDILOL 12.5 MG/1
12.5 TABLET ORAL 2 TIMES DAILY WITH MEALS
Status: DISCONTINUED | OUTPATIENT
Start: 2017-02-28 | End: 2017-03-01 | Stop reason: HOSPADM

## 2017-02-28 RX ORDER — HEPARIN SODIUM 5000 [USP'U]/ML
5000 INJECTION, SOLUTION INTRAVENOUS; SUBCUTANEOUS EVERY 12 HOURS SCHEDULED
Status: DISCONTINUED | OUTPATIENT
Start: 2017-02-28 | End: 2017-03-01 | Stop reason: HOSPADM

## 2017-02-28 RX ORDER — SODIUM CHLORIDE 9 MG/ML
125 INJECTION, SOLUTION INTRAVENOUS CONTINUOUS
Status: ACTIVE | OUTPATIENT
Start: 2017-02-28 | End: 2017-03-01

## 2017-02-28 RX ORDER — ACETAMINOPHEN 160 MG
2000 TABLET,DISINTEGRATING ORAL DAILY
Status: DISCONTINUED | OUTPATIENT
Start: 2017-02-28 | End: 2017-02-28 | Stop reason: SDUPTHER

## 2017-02-28 RX ORDER — LEVOTHYROXINE SODIUM 0.1 MG/1
100 TABLET ORAL
Status: DISCONTINUED | OUTPATIENT
Start: 2017-03-01 | End: 2017-03-01 | Stop reason: HOSPADM

## 2017-02-28 RX ORDER — SODIUM CHLORIDE 0.9 % (FLUSH) 0.9 %
1-10 SYRINGE (ML) INJECTION AS NEEDED
Status: DISCONTINUED | OUTPATIENT
Start: 2017-02-28 | End: 2017-03-01 | Stop reason: HOSPADM

## 2017-02-28 RX ORDER — MELATONIN
2000 DAILY
Status: DISCONTINUED | OUTPATIENT
Start: 2017-02-28 | End: 2017-03-01 | Stop reason: HOSPADM

## 2017-02-28 RX ORDER — MEMANTINE HYDROCHLORIDE 10 MG/1
10 TABLET ORAL DAILY
Status: DISCONTINUED | OUTPATIENT
Start: 2017-02-28 | End: 2017-03-01 | Stop reason: HOSPADM

## 2017-02-28 RX ORDER — NICOTINE POLACRILEX 4 MG
15 LOZENGE BUCCAL
Status: DISCONTINUED | OUTPATIENT
Start: 2017-02-28 | End: 2017-03-01 | Stop reason: HOSPADM

## 2017-02-28 RX ORDER — IPRATROPIUM BROMIDE AND ALBUTEROL SULFATE 2.5; .5 MG/3ML; MG/3ML
3 SOLUTION RESPIRATORY (INHALATION)
Status: DISCONTINUED | OUTPATIENT
Start: 2017-02-28 | End: 2017-03-01 | Stop reason: HOSPADM

## 2017-02-28 RX ORDER — DIPHENHYDRAMINE HCL 50 MG
25 CAPSULE ORAL NIGHTLY PRN
COMMUNITY

## 2017-02-28 RX ORDER — ACETAMINOPHEN 325 MG/1
650 TABLET ORAL EVERY 4 HOURS PRN
Status: DISCONTINUED | OUTPATIENT
Start: 2017-02-28 | End: 2017-03-01 | Stop reason: HOSPADM

## 2017-02-28 RX ORDER — ATORVASTATIN CALCIUM 20 MG/1
20 TABLET, FILM COATED ORAL DAILY
Status: DISCONTINUED | OUTPATIENT
Start: 2017-02-28 | End: 2017-03-01 | Stop reason: HOSPADM

## 2017-02-28 RX ADMIN — ASPIRIN 81 MG: 81 TABLET ORAL at 21:54

## 2017-02-28 RX ADMIN — ATORVASTATIN CALCIUM 20 MG: 20 TABLET, FILM COATED ORAL at 22:03

## 2017-02-28 RX ADMIN — Medication 1 TABLET: at 21:53

## 2017-02-28 RX ADMIN — DICYCLOMINE HYDROCHLORIDE 20 MG: 20 INJECTION, SOLUTION INTRAMUSCULAR at 13:05

## 2017-02-28 RX ADMIN — MEMANTINE HYDROCHLORIDE 10 MG: 10 TABLET, FILM COATED ORAL at 21:55

## 2017-02-28 RX ADMIN — HUMAN INSULIN 20 UNITS: 100 INJECTION, SUSPENSION SUBCUTANEOUS at 21:46

## 2017-02-28 RX ADMIN — HYDRALAZINE HYDROCHLORIDE AND ISOSORBIDE DINITRATE 0.5 TABLET: 37.5; 2 TABLET, FILM COATED ORAL at 21:54

## 2017-02-28 RX ADMIN — SODIUM CHLORIDE 500 ML: 9 INJECTION, SOLUTION INTRAVENOUS at 13:04

## 2017-02-28 RX ADMIN — IPRATROPIUM BROMIDE AND ALBUTEROL SULFATE 3 ML: .5; 3 SOLUTION RESPIRATORY (INHALATION) at 19:59

## 2017-02-28 RX ADMIN — AMIODARONE HYDROCHLORIDE 200 MG: 200 TABLET ORAL at 22:05

## 2017-02-28 RX ADMIN — SODIUM CHLORIDE 125 ML/HR: 9 INJECTION, SOLUTION INTRAVENOUS at 15:39

## 2017-02-28 RX ADMIN — CARVEDILOL 12.5 MG: 12.5 TABLET, FILM COATED ORAL at 21:55

## 2017-02-28 RX ADMIN — VITAMIN D, TAB 1000IU (100/BT) 2000 UNITS: 25 TAB at 22:24

## 2017-02-28 RX ADMIN — HEPARIN SODIUM 5000 UNITS: 5000 INJECTION, SOLUTION INTRAVENOUS; SUBCUTANEOUS at 22:03

## 2017-02-28 RX ADMIN — DONEPEZIL HYDROCHLORIDE 10 MG: 10 TABLET, FILM COATED ORAL at 21:55

## 2017-02-28 RX ADMIN — Medication 1000 MCG: at 21:53

## 2017-03-01 VITALS
DIASTOLIC BLOOD PRESSURE: 68 MMHG | RESPIRATION RATE: 18 BRPM | TEMPERATURE: 97.5 F | BODY MASS INDEX: 26.05 KG/M2 | HEIGHT: 70 IN | HEART RATE: 63 BPM | SYSTOLIC BLOOD PRESSURE: 116 MMHG | OXYGEN SATURATION: 97 % | WEIGHT: 182 LBS

## 2017-03-01 PROBLEM — N18.9 ACUTE ON CHRONIC RENAL INSUFFICIENCY: Status: RESOLVED | Noted: 2017-02-28 | Resolved: 2017-03-01

## 2017-03-01 PROBLEM — N28.9 ACUTE ON CHRONIC RENAL INSUFFICIENCY: Status: RESOLVED | Noted: 2017-02-28 | Resolved: 2017-03-01

## 2017-03-01 PROBLEM — E86.0 DEHYDRATION: Status: RESOLVED | Noted: 2017-02-28 | Resolved: 2017-03-01

## 2017-03-01 PROBLEM — R19.7 DIARRHEA: Status: RESOLVED | Noted: 2017-02-28 | Resolved: 2017-03-01

## 2017-03-01 LAB
ALBUMIN SERPL-MCNC: 3.3 G/DL (ref 3.5–5.2)
ALBUMIN/GLOB SERPL: 1 G/DL
ALP SERPL-CCNC: 64 U/L (ref 39–117)
ALT SERPL W P-5'-P-CCNC: 43 U/L (ref 1–41)
ANION GAP SERPL CALCULATED.3IONS-SCNC: 18.7 MMOL/L
AST SERPL-CCNC: 40 U/L (ref 1–40)
BASOPHILS # BLD AUTO: 0.02 10*3/MM3 (ref 0–0.2)
BASOPHILS NFR BLD AUTO: 0.2 % (ref 0–1.5)
BILIRUB SERPL-MCNC: 0.5 MG/DL (ref 0.1–1.2)
BUN BLD-MCNC: 41 MG/DL (ref 8–23)
BUN/CREAT SERPL: 19.3 (ref 7–25)
CALCIUM SPEC-SCNC: 8.5 MG/DL (ref 8.6–10.5)
CHLORIDE SERPL-SCNC: 102 MMOL/L (ref 98–107)
CO2 SERPL-SCNC: 16.3 MMOL/L (ref 22–29)
CREAT BLD-MCNC: 2.12 MG/DL (ref 0.76–1.27)
DEPRECATED RDW RBC AUTO: 48.9 FL (ref 37–54)
EOSINOPHIL # BLD AUTO: 1.04 10*3/MM3 (ref 0–0.7)
EOSINOPHIL NFR BLD AUTO: 8.3 % (ref 0.3–6.2)
ERYTHROCYTE [DISTWIDTH] IN BLOOD BY AUTOMATED COUNT: 14.4 % (ref 11.5–14.5)
GFR SERPL CREATININE-BSD FRML MDRD: 31 ML/MIN/1.73
GLOBULIN UR ELPH-MCNC: 3.2 GM/DL
GLUCOSE BLD-MCNC: 147 MG/DL (ref 65–99)
GLUCOSE BLDC GLUCOMTR-MCNC: 306 MG/DL (ref 70–130)
HCT VFR BLD AUTO: 49.5 % (ref 40.4–52.2)
HGB BLD-MCNC: 16.8 G/DL (ref 13.7–17.6)
IMM GRANULOCYTES # BLD: 0.12 10*3/MM3 (ref 0–0.03)
IMM GRANULOCYTES NFR BLD: 1 % (ref 0–0.5)
LIPASE SERPL-CCNC: 47 U/L (ref 13–60)
LYMPHOCYTES # BLD AUTO: 1.23 10*3/MM3 (ref 0.9–4.8)
LYMPHOCYTES NFR BLD AUTO: 9.8 % (ref 19.6–45.3)
MCH RBC QN AUTO: 32 PG (ref 27–32.7)
MCHC RBC AUTO-ENTMCNC: 33.9 G/DL (ref 32.6–36.4)
MCV RBC AUTO: 94.3 FL (ref 79.8–96.2)
MONOCYTES # BLD AUTO: 1.26 10*3/MM3 (ref 0.2–1.2)
MONOCYTES NFR BLD AUTO: 10 % (ref 5–12)
NEUTROPHILS # BLD AUTO: 8.91 10*3/MM3 (ref 1.9–8.1)
NEUTROPHILS NFR BLD AUTO: 70.7 % (ref 42.7–76)
PLATELET # BLD AUTO: 218 10*3/MM3 (ref 140–500)
PMV BLD AUTO: 9.7 FL (ref 6–12)
POTASSIUM BLD-SCNC: 4.2 MMOL/L (ref 3.5–5.2)
PROT SERPL-MCNC: 6.5 G/DL (ref 6–8.5)
RBC # BLD AUTO: 5.25 10*6/MM3 (ref 4.6–6)
SODIUM BLD-SCNC: 137 MMOL/L (ref 136–145)
TSH SERPL DL<=0.05 MIU/L-ACNC: 2.1 MIU/ML (ref 0.27–4.2)
WBC NRBC COR # BLD: 12.58 10*3/MM3 (ref 4.5–10.7)

## 2017-03-01 PROCEDURE — 83690 ASSAY OF LIPASE: CPT | Performed by: INTERNAL MEDICINE

## 2017-03-01 PROCEDURE — 85025 COMPLETE CBC W/AUTO DIFF WBC: CPT | Performed by: INTERNAL MEDICINE

## 2017-03-01 PROCEDURE — 82962 GLUCOSE BLOOD TEST: CPT

## 2017-03-01 PROCEDURE — 63710000001 INSULIN ISOPHANE HUMAN PER 5 UNITS: Performed by: INTERNAL MEDICINE

## 2017-03-01 PROCEDURE — 25010000002 HEPARIN (PORCINE) PER 1000 UNITS: Performed by: INTERNAL MEDICINE

## 2017-03-01 PROCEDURE — G0378 HOSPITAL OBSERVATION PER HR: HCPCS

## 2017-03-01 PROCEDURE — 94799 UNLISTED PULMONARY SVC/PX: CPT

## 2017-03-01 PROCEDURE — 80053 COMPREHEN METABOLIC PANEL: CPT | Performed by: INTERNAL MEDICINE

## 2017-03-01 PROCEDURE — 96372 THER/PROPH/DIAG INJ SC/IM: CPT

## 2017-03-01 PROCEDURE — 84443 ASSAY THYROID STIM HORMONE: CPT | Performed by: INTERNAL MEDICINE

## 2017-03-01 RX ORDER — TORSEMIDE 20 MG/1
20 TABLET ORAL DAILY
Start: 2017-03-01

## 2017-03-01 RX ADMIN — Medication 1 TABLET: at 08:10

## 2017-03-01 RX ADMIN — AMIODARONE HYDROCHLORIDE 200 MG: 200 TABLET ORAL at 08:10

## 2017-03-01 RX ADMIN — LEVOTHYROXINE SODIUM 100 MCG: 100 TABLET ORAL at 06:04

## 2017-03-01 RX ADMIN — HUMAN INSULIN 20 UNITS: 100 INJECTION, SUSPENSION SUBCUTANEOUS at 08:08

## 2017-03-01 RX ADMIN — ATORVASTATIN CALCIUM 20 MG: 20 TABLET, FILM COATED ORAL at 08:10

## 2017-03-01 RX ADMIN — HYDRALAZINE HYDROCHLORIDE AND ISOSORBIDE DINITRATE 0.5 TABLET: 37.5; 2 TABLET, FILM COATED ORAL at 08:09

## 2017-03-01 RX ADMIN — VITAMIN D, TAB 1000IU (100/BT) 2000 UNITS: 25 TAB at 08:09

## 2017-03-01 RX ADMIN — ASPIRIN 81 MG: 81 TABLET ORAL at 08:10

## 2017-03-01 RX ADMIN — CARVEDILOL 12.5 MG: 12.5 TABLET, FILM COATED ORAL at 08:10

## 2017-03-01 RX ADMIN — MEMANTINE HYDROCHLORIDE 10 MG: 10 TABLET, FILM COATED ORAL at 08:09

## 2017-03-01 RX ADMIN — HEPARIN SODIUM 5000 UNITS: 5000 INJECTION, SOLUTION INTRAVENOUS; SUBCUTANEOUS at 08:09

## 2017-03-01 RX ADMIN — IPRATROPIUM BROMIDE AND ALBUTEROL SULFATE 3 ML: .5; 3 SOLUTION RESPIRATORY (INHALATION) at 07:03

## 2017-03-01 RX ADMIN — Medication 1000 MCG: at 08:09

## 2017-03-01 NOTE — DISCHARGE SUMMARY
"Date of Admission: 2/28/2017  Date of Discharge:  3/1/2017  Primary Care Physician: Juan Hurd MD     Discharge Diagnosis:  Active Hospital Problems (** Indicates Principal Problem)    Diagnosis Date Noted   • Diabetes mellitus [E11.9] 02/28/2017   • Leukocytosis [D72.829] 05/17/2016   • Parkinson's disease [G20] 03/17/2016   • Dementia, vascular [F01.50] 03/17/2016      Resolved Hospital Problems    Diagnosis Date Noted Date Resolved   • **Acute on chronic renal insufficiency [N28.9, N18.9] 02/28/2017 03/01/2017   • Dehydration [E86.0] 02/28/2017 03/01/2017   • Diarrhea [R19.7] 02/28/2017 03/01/2017       Presenting Problem/History of Present Illness  Dehydration [E86.0]  Hyponatremia [E87.1]  Hyperglycemia [R73.9]  Acute on chronic renal insufficiency [N28.9, N18.9]  Elevated lipase [R74.8]     Hospital Course  Patient is a 77 y.o. male who presented with decreased PO intake and diarrhea which was associated with colchicine which he was taking for gouty arthritis of the hands.  He was found to be volume depleted and he had an AGUSTIN with Cr of 2.84 and baseline of about 2.0.  This improved with IV fluids, as did his diarrhea.  His creatinine was 2.1 on the day of discharge.  Cdiff toxin was negative.  He was tolerating PO well at the time of discharge, though he notes that his appetite has been decreased over the past several months.  Of note, he had an elevated lipase to 84 on admission which normalized overnight.  CT of the abdomen showed no acute pancreatic issues, and this lipase normalized overnight.  He was advised to avoid colchicine for now, especially since his gout flare has resolved.  His torsemide dose was also decreased to once daily.  He is to have close follow up with his PCP to assess volume status and renal function.        Physical Examination on the day of discharge:  Blood pressure 116/68, pulse 63, temperature 97.5 °F (36.4 °C), temperature source Oral, resp. rate 18, height 70\" (177.8 cm), " weight 182 lb (82.6 kg), SpO2 97 %.  General: Alert, no acute distress  Eyes: EOMI, normal conjunctivae  Neck: Supple, no jvd  Heart: normal rate, regular rhythm  Lungs: clear to ascultation, normal effort  Abdomen: soft, non-tender, bowel sounds normoactive  Neuro: alert, oriented x3, no gross deficits      Procedures Performed:  ABDOMEN AND PELVIS CT WITHOUT CONTRAST      HISTORY: Left lower quadrant pain, possible diverticulitis.      TECHNIQUE: CT of the abdomen and pelvis was performed without contrast  and is correlated with a CT scan from 08/04/2010.      FINDINGS: There are leads from a cardiac defibrillator. The visualized  lung bases are clear. Parenchyma of the liver, kidneys, pancreas,  adrenals, and spleen has a normal noncontrast CT appearance. There are  clips from cholecystectomy.      There are a few sigmoid colon diverticula. There is no evidence of  diverticulitis. The appendix is in situ and appears normal. There is no  abnormal appearing small bowel. No solid-appearing stool is observed in  the colon or rectum; the bowel contents in these areas is liquid. This  is compatible with diarrhea. However, there is no colon wall thickening.      There is no lymphadenopathy. The aorta is calcified but normal in  caliber. There is no bone or body wall lesion.      IMPRESSION:  No acute abnormality is identified. I discussed the findings  with Dr. Padilla at 2:20 p.m.       This report was finalized on 2/28/2017 5:54 PM by Dr. Joe English MD.       Consults:   Consults     Date and Time Order Name Status Description    2/28/2017 1458 [facility] (on-call MD unless specified) Completed              Condition on Discharge: Stable, Improved    Discharge Disposition  Home or Self Care    Discharge Medications:   Eugenio Vargas   Home Medication Instructions MALA:[id number]    Printed on:03/01/17 1136   Medication Information                      acetaminophen (TYLENOL) 500 MG tablet  Take 500 mg by mouth Every  6 (Six) Hours As Needed for mild pain (1-3).             ALPRAZolam (XANAX) 0.25 MG tablet  Take 0.25 mg by mouth 3 (Three) Times a Day As Needed for anxiety (PER PT WIFE, MORTON NOT TAKEN IN 3 MONTHS, WILL GIVE IN RARE OCCASSIONS (PANIC ATTACK)).             amiodarone (PACERONE) 200 MG tablet  Take 200 mg by mouth Daily.             ANORO ELLIPTA 62.5-25 MCG/INH aerosol powder   TAKE 1 INHALATION D             aspirin 81 MG EC tablet  Take  by mouth daily.             BIDIL 20-37.5 MG per tablet  TK 1/2 T PO BID             carvedilol (COREG) 12.5 MG tablet  TAKE ONE AND ONE-HALF TABLETS BY MOUTH TWICE DAILY AS DIRECTED             Cholecalciferol (VITAMIN D3) 5000 UNITS capsule capsule  Take 5,000 Units by mouth Daily.             diphenhydrAMINE (BENADRYL) 50 MG capsule  Take 25 mg by mouth At Night As Needed for itching. 2 TABLETS             donepezil (ARICEPT) 10 MG tablet  Take 1 tablet by mouth Every Night for 90 days.             insulin NPH (HumuLIN,NovoLIN) 100 UNIT/ML injection  Inject 20 Units under the skin 2 (Two) Times a Day Before Meals.             levothyroxine (SYNTHROID, LEVOTHROID) 100 MCG tablet  Take 100 mcg by mouth.             memantine (NAMENDA) 10 MG tablet  Take 10 mg by mouth Daily.             Multiple Vitamin (MULTIVITAMIN) tablet  Take 1 tablet by mouth Daily.             rosuvastatin (CRESTOR) 10 MG tablet  Take 10 mg by mouth Daily.             spironolactone (ALDACTONE) 25 MG tablet  Take 25 mg by mouth Daily.             Testosterone Cypionate (DEPOTESTOTERONE CYPIONATE) 200 MG/ML injection  Inject 200 mg into the shoulder, thigh, or buttocks Every 21 (Twenty-One) Days. DUE 2/27/17             torsemide (DEMADEX) 20 MG tablet  Take 1 tablet by mouth Daily. 20MG TABLETS, TAKES 80MG BID             vitamin B-12 (CYANOCOBALAMIN) 1000 MCG tablet  Take 1,000 mcg by mouth Daily.                 Discharge Diet: Cardiac, Diabetic    Activity at Discharge: Increase activity as  tolerated    Follow-up Appointments:  Future Appointments  Date Time Provider Department Center   5/22/2017 10:00 AM Lalitha Brandon MD [facility] None   6/2/2017 9:00 AM MD LIZETT Martinez [facility] None         Test Results Pending at Discharge   Order Current Status    Stool Culture In process           Jayesh Huang MD  03/01/17  11:36 AM    Time Spent on Discharge Activities: Greater than 30 minutes

## 2017-03-01 NOTE — NURSING NOTE
Please re-evaluate need for diuretics in am. See comments under home medication: torsemide, pt takes 20 mg tablets (80mg) twice a day.

## 2017-03-01 NOTE — PLAN OF CARE
Problem: Patient Care Overview (Adult)  Goal: Plan of Care Review  Outcome: Ongoing (interventions implemented as appropriate)    03/01/17 0531   Coping/Psychosocial Response Interventions   Plan Of Care Reviewed With patient   Patient Care Overview   Progress improving   Outcome Evaluation   Outcome Summary/Follow up Plan VSS. No acute changes. Pt received 1 L of NS. Ambulated around nurse's station 1x. Resting. Will continue to monitor.        Goal: Adult Individualization and Mutuality  Outcome: Ongoing (interventions implemented as appropriate)  Goal: Discharge Needs Assessment  Outcome: Ongoing (interventions implemented as appropriate)    Problem: Diarrhea (Adult)  Goal: Identify Related Risk Factors and Signs and Symptoms  Outcome: Ongoing (interventions implemented as appropriate)  Goal: Improved/Reduced Symptoms  Outcome: Ongoing (interventions implemented as appropriate)    Problem: Renal Failure/Kidney Injury, Acute (Adult)  Goal: Signs and Symptoms of Listed Potential Problems Will be Absent or Manageable (Renal Failure/Kidney Injury, Acute)  Outcome: Ongoing (interventions implemented as appropriate)

## 2017-03-01 NOTE — H&P
Internal medicine history and physical    INTERNAL MEDICINE   [facility]       Patient Identification:  Name: Eugenio Vargas  Age: 77 y.o.  Sex: male  :  1940  MRN: [id number]                   Primary Care Physician: Juan Hurd MD                                   Chief Complaint:  Weakness and recurrent diarrhea progressive over the period of 3 weeks.    History of Present Illness:   Patient is a 77-year-old male who is underlying dementia as well as complex issues such as gout and just of heart failure, hypertension, diabetes was in his usual state of his health until 3 weeks ago when he developed gout involving the hands and wrist.  He was started on colchicine and few days after colchicine he started having significant diarrhea which was off and on.  He was able to manage but was getting weaker.  Saturday it was much improved but  he developed significant diarrhea which again got better yesterday and again 3:00 in the morning today his diarrhea back again.  He was improving but this morning was very weak and because of that he was brought to the emergency room for further evaluation.  In the emergency room he was found to be severely dehydrated and weak and resting in his renal function.  Patient was given IV fluids and he started to feel better but because of his context comorbidities and significant change in status it was requested that he would need hospitalization to fine-tune his hemodynamic status and fluid balance.  Patient says that he is feeling better and his appetite is coming back he mild abdominal discomfort.  He denies taking any recent antibiotics.      Past Medical History:  Past Medical History   Diagnosis Date   • Anxiety    • Arthritis    • Atrial tachycardia, paroxysmal    • Brain bleed      DEFBRILLATOR FIRED AND PT FELL   • CAD (coronary artery disease)    • CHF (congestive heart failure)    • CKD (chronic kidney disease)    • COPD (chronic  obstructive pulmonary disease)    • DDD (degenerative disc disease), lumbar    • Dementia    • Diabetes mellitus    • Gout    • History of positive PPD      pt states positive reactor, but never diagnosed/treated for TB   • HLD (hyperlipidemia)    • HTN (hypertension)    • Hypothyroid    • Ischemic cardiomyopathy    • Mitral insufficiency    • SASHA (obstructive sleep apnea)    • Osteoarthritis    • PAF (paroxysmal atrial fibrillation)    • Pulmonary HTN    • Spinal stenosis    • Stroke      STROKE OCCURED DURING CARDIAC SURGERY IN 2010; MEMORY LOSS   • V-tach      Paroxysmal     Past Surgical History:  Past Surgical History   Procedure Laterality Date   • Shoulder surgery     • Sinus surgery     • Mitral valve annuloplasty     • Coronary angioplasty with stent placement     • Cholecystectomy     • Coronary artery bypass graft     • Eye surgery     • Lumbar epidural injection  02/16/2017   • Colonoscopy        Home Meds:  Prescriptions Prior to Admission   Medication Sig Dispense Refill Last Dose   • ALPRAZolam (XANAX) 0.25 MG tablet Daily.   Taking   • amiodarone (PACERONE) 200 MG tablet Take 200 mg by mouth.   Taking   • ANORO ELLIPTA 62.5-25 MCG/INH aerosol powder  TAKE 1 INHALATION D  5 Taking   • aspirin 81 MG EC tablet Take  by mouth daily.   Taking   • BIDIL 20-37.5 MG per tablet TK 1/2 T PO BID  4 Taking   • carvedilol (COREG) 12.5 MG tablet TAKE ONE AND ONE-HALF TABLETS BY MOUTH TWICE DAILY AS DIRECTED   Taking   • diphenhydrAMINE (BENADRYL) 50 MG capsule Take 25 mg by mouth At Night As Needed for itching. 2 TABLETS      • donepezil (ARICEPT) 10 MG tablet Take 1 tablet by mouth Every Night for 90 days. 90 tablet 3 Taking   • insulin NPH (HumuLIN,NovoLIN) 100 UNIT/ML injection Inject 20 Units under the skin 2 (Two) Times a Day Before Meals.   Taking   • levothyroxine (SYNTHROID, LEVOTHROID) 100 MCG tablet Take 100 mcg by mouth.   Taking   • memantine (NAMENDA) 10 MG tablet Take 10 mg by mouth Daily.      •  Multiple Vitamin (MULTIVITAMIN) tablet Take 1 tablet by mouth.   Taking   • rosuvastatin (CRESTOR) 10 MG tablet Take  by mouth.   Taking   • spironolactone (ALDACTONE) 25 MG tablet Take 25 mg by mouth.   Taking   • Testosterone Cypionate (DEPOTESTOTERONE CYPIONATE) 200 MG/ML injection 200 mg.   Taking   • torsemide (DEMADEX) 20 MG tablet Take 20 mg by mouth.   Taking     Current Meds:     Current Facility-Administered Medications:   •  sodium chloride 0.9 % flush 10 mL, 10 mL, Intravenous, PRN, Miriam Padilla MD  •  Insert peripheral IV, , , Once **AND** sodium chloride 0.9 % flush 10 mL, 10 mL, Intravenous, PRN, Miriam Padlila MD  •  sodium chloride 0.9 % infusion, 125 mL/hr, Intravenous, Continuous, Miriam Padilla MD, Last Rate: 125 mL/hr at 02/28/17 1539, 125 mL/hr at 02/28/17 1539  Allergies:  Allergies   Allergen Reactions   • Ambien [Zolpidem Tartrate] Hallucinations     PSYCHOSIS; PT HAS SIMILAR REACTION TO ALL HYPNOTICS   • Hydrocodone-Acetaminophen Hallucinations     PSYCHOSIS; HAS SIMILAR REACTION TO ALL OPIODS   • Lorazepam Mental Status Change     PSYCHOSIS; PT HAS SIMILAR REACTION TO ALL BENZODIAZEPINES     Social History:   Social History   Substance Use Topics   • Smoking status: Former Smoker     Packs/day: 1.00   • Smokeless tobacco: Never Used      Comment: PT QUIT 45 YEARS AGO   • Alcohol use No      Family History:  Family History   Problem Relation Age of Onset   • Intracerebral hemorrhage Mother    • Heart disease Father    • Cancer Sister    • Diabetes Other           Review of Systems  See history of present illness and past medical history.  Constitutional remarkable for significant weakness tiredness but no fever or chills.  Cardiovascular: Remarkable for no chest pain or shortness of breath or swelling of his legs  Respiratory: Remarkable for no cough or congestion or wheezing  Gastrintestinal: Remarkable for mild abdominal discomfort and recurrent diarrhea as detailed in the history of  "presenting illness off and on for the last 3 weeks really worse in the last 24 hours.  No vomiting.  : Remarkable for no burning in urination  Musculoskeletal: Remarkable for chronic low back pain and pain in his joints but no acute attack of gouty arthritis.  Neurological review systems remarkable for weakness.  Vitals:   Visit Vitals   • /80 (BP Location: Left arm, Patient Position: Lying)   • Pulse 65   • Temp 98.2 °F (36.8 °C) (Oral)   • Resp 16   • Ht 70\" (177.8 cm)   • Wt 182 lb (82.6 kg)   • SpO2 95%   • BMI 26.11 kg/m2     I/O:   Intake/Output Summary (Last 24 hours) at 02/28/17 1906  Last data filed at 02/28/17 1504   Gross per 24 hour   Intake   1000 ml   Output      0 ml   Net   1000 ml     Exam:  General Appearance:    Alert, cooperative, no distress, appears stated age, does not appear to be toxic or septic.     Head:    Normocephalic, without obvious abnormality, atraumatic   Eyes:    PERRL, conjunctiva/corneas clear, EOM's intact, both eyes   Ears:    Normal external ear canals, both ears   Nose:   Nares normal, septum midline, mucosa normal, no drainage    or sinus tenderness   Throat:   Lips, tongue, gums normal; oral mucosa pink and moist   Neck:   Supple, symmetrical, trachea midline, no adenopathy;     thyroid:  no enlargement/tenderness/nodules; no carotid    bruit or JVD   Back:     Symmetric, no curvature, ROM normal, no CVA tenderness   Lungs:     Clear to auscultation bilaterally, respirations unlabored   Chest Wall:    No tenderness or deformity    Heart:    Regular rate and rhythm, S1 and S2 normal, no murmur, rub   or gallop   Abdomen:     Soft, mild non-discreet abdominal discomfort mainly in the lower part of his abdomen, bowel sounds active all four quadrants,     no masses, no hepatomegaly, no splenomegaly   Extremities:   no edema of the extremity has some ecchymosis but no obvious arthritic pattern noted    Pulses:   Pulses palpable in all extremities; symmetric all " extremities   Skin:   Skin color normal, Skin is warm and dry,  no rashes or palpable lesions   Neurologic:   CNII-XII intact, motor strength grossly intact, sensation grossly intact to light touch, no focal deficits noted       Data Review:      I reviewed the patient's new clinical results.    Results from last 7 days  Lab Units 02/28/17  1203   WBC 10*3/mm3 13.48*   HEMOGLOBIN g/dL 17.6   PLATELETS 10*3/mm3 238       Results from last 7 days  Lab Units 02/28/17  1203   SODIUM mmol/L 131*   POTASSIUM mmol/L 4.5   CHLORIDE mmol/L 93*   TOTAL CO2 mmol/L 18.6*   BUN mg/dL 98*   CREATININE mg/dL 2.84*   CALCIUM mg/dL 8.6   GLUCOSE mg/dL 249*       Assessment:  Principal Problem:    Acute on chronic renal insufficiency  Active Problems:    Dementia, vascular    Parkinson's disease    Leukocytosis    Dehydration    Diarrhea      Plan:  Hold colchicine cautiously hydrate him hold his diuretics for 24 hours and after completion of his current bag of IV fluids and reassess the need for introduction of diuretics and decide whether that I was reduced to continue.  Overall care plan discussed with patient's wife.    Lorne Esparza MD   2/28/2017  7:06 PM  Much of this encounter note is an electronic transcription/translation of spoken language to printed text. The electronic translation of spoken language may permit erroneous, or at times, nonsensical words or phrases to be inadvertently transcribed; Although I have reviewed the note for such errors, some may still exist

## 2017-03-03 ENCOUNTER — LAB (OUTPATIENT)
Dept: LAB | Facility: HOSPITAL | Age: 77
End: 2017-03-03

## 2017-03-03 ENCOUNTER — TRANSCRIBE ORDERS (OUTPATIENT)
Dept: ADMINISTRATIVE | Facility: HOSPITAL | Age: 77
End: 2017-03-03

## 2017-03-03 DIAGNOSIS — I50.9 CONGESTIVE HEART FAILURE, UNSPECIFIED: ICD-10-CM

## 2017-03-03 DIAGNOSIS — E03.9 UNSPECIFIED HYPOTHYROIDISM: ICD-10-CM

## 2017-03-03 DIAGNOSIS — I50.9 CONGESTIVE HEART FAILURE, UNSPECIFIED: Primary | ICD-10-CM

## 2017-03-03 LAB
ANION GAP SERPL CALCULATED.3IONS-SCNC: 15.4 MMOL/L
BACTERIA SPEC AEROBE CULT: NORMAL
BACTERIA SPEC AEROBE CULT: NORMAL
BUN BLD-MCNC: 82 MG/DL (ref 8–23)
BUN/CREAT SERPL: 36.4 (ref 7–25)
CALCIUM SPEC-SCNC: 9.2 MG/DL (ref 8.6–10.5)
CHLORIDE SERPL-SCNC: 98 MMOL/L (ref 98–107)
CO2 SERPL-SCNC: 23.6 MMOL/L (ref 22–29)
CREAT BLD-MCNC: 2.25 MG/DL (ref 0.76–1.27)
GFR SERPL CREATININE-BSD FRML MDRD: 28 ML/MIN/1.73
GLUCOSE BLD-MCNC: 277 MG/DL (ref 65–99)
POTASSIUM BLD-SCNC: 5.2 MMOL/L (ref 3.5–5.2)
SODIUM BLD-SCNC: 137 MMOL/L (ref 136–145)
TSH SERPL DL<=0.05 MIU/L-ACNC: 2.31 MIU/ML (ref 0.27–4.2)

## 2017-03-03 PROCEDURE — 84443 ASSAY THYROID STIM HORMONE: CPT | Performed by: INTERNAL MEDICINE

## 2017-03-03 PROCEDURE — 80048 BASIC METABOLIC PNL TOTAL CA: CPT | Performed by: INTERNAL MEDICINE

## 2017-03-03 PROCEDURE — 36415 COLL VENOUS BLD VENIPUNCTURE: CPT

## 2017-04-17 ENCOUNTER — OFFICE VISIT (OUTPATIENT)
Dept: NEUROSURGERY | Facility: CLINIC | Age: 77
End: 2017-04-17

## 2017-04-17 VITALS
HEART RATE: 68 BPM | SYSTOLIC BLOOD PRESSURE: 126 MMHG | DIASTOLIC BLOOD PRESSURE: 74 MMHG | HEIGHT: 71 IN | WEIGHT: 186 LBS | BODY MASS INDEX: 26.04 KG/M2

## 2017-04-17 DIAGNOSIS — M48.061 STENOSIS, SPINAL, LUMBAR: Primary | ICD-10-CM

## 2017-04-17 DIAGNOSIS — M51.36 DEGENERATION OF LUMBAR INTERVERTEBRAL DISC: ICD-10-CM

## 2017-04-17 PROCEDURE — 99213 OFFICE O/P EST LOW 20 MIN: CPT | Performed by: NEUROLOGICAL SURGERY

## 2017-04-17 RX ORDER — GABAPENTIN 100 MG/1
100 CAPSULE ORAL 2 TIMES DAILY
Qty: 60 CAPSULE | Refills: 3 | Status: SHIPPED | OUTPATIENT
Start: 2017-04-17 | End: 2017-05-22

## 2017-04-17 NOTE — PROGRESS NOTES
Subjective   Patient ID: Eugenio Vargas is a 77 y.o. male is here today for follow-up of back pain.    At the patient's last visit, repeat blocks were ordered.      Today, the patient notes treatment x1.  He notes that he didn't seem to really have a lot of relief with HANNAH. The patient notes that he is not currently scheduled for another HANNAH but is due for one soon.    The patient also notes a new problem of increased right low back pain.  He notes that he has severe pain in his lumbar spine.  He notes that his pain is intermittent and happens daily, noting when it does happen, it can incapacitate him.  He also notes some pain in his left low back as well.    He notes that sitting for long times in his car will increase his pain symptoms.  The patient notes that during a flareup of his pain, he has been taking Tylenol (noting that he has been taking 6-8 Tylenol daily for his pain symptoms).  He notes that this dulls his pain.  He notes that when his pain symptoms are severe, Tylenol doesn't seem to help either.     The patient notes that he has not had any new imaging of his lumbar spine recently.    Back Pain   This is a chronic problem. The current episode started more than 1 month ago. The problem occurs daily. The problem is unchanged. Pertinent negatives include no fever.     This patient has known lumbar stenosis at L4-L5 with a spondylolisthesis.  We're trying to avoid surgery.  He had one block which didn't help as much this time.  He has back and right leg pain.  He will contact the pain clinic to get the second and the third.  In the meantime we'll start some gabapentin 100 mg twice a day and see how it works.  He has a pacemaker and cannot have an MRI.  If all else fails, that he'll need a myelogram.      The following portions of the patient's history were reviewed and updated as appropriate: allergies, current medications, past family history, past medical history, past social history, past surgical  history and problem list.    Review of Systems   Constitutional: Negative for fever.   Musculoskeletal: Positive for back pain.   Neurological: Negative for syncope.   All other systems reviewed and are negative.      Objective   Physical Exam   Constitutional: He is oriented to person, place, and time. He appears well-developed and well-nourished.   HENT:   Head: Normocephalic and atraumatic.   Eyes: Conjunctivae and EOM are normal. Pupils are equal, round, and reactive to light.   Fundoscopic exam:       The right eye shows no papilledema. The right eye shows venous pulsations.        The left eye shows no papilledema. The left eye shows venous pulsations.   Neck: Carotid bruit is not present.   Neurological: He is oriented to person, place, and time. He has a normal Finger-Nose-Finger Test and a normal Heel to Shin Test. Gait normal.   Reflex Scores:       Tricep reflexes are 2+ on the right side and 2+ on the left side.       Bicep reflexes are 2+ on the right side and 2+ on the left side.       Brachioradialis reflexes are 2+ on the right side and 2+ on the left side.       Patellar reflexes are 2+ on the right side and 2+ on the left side.       Achilles reflexes are 2+ on the right side and 2+ on the left side.  Psychiatric: His speech is normal.     Neurologic Exam     Mental Status   Oriented to person, place, and time.   Registration of memory: Good recent and remote memory.   Attention: normal. Concentration: normal.   Speech: speech is normal   Level of consciousness: alert  Knowledge: consistent with education.     Cranial Nerves     CN II   Visual fields full to confrontation.   Visual acuity: normal    CN III, IV, VI   Pupils are equal, round, and reactive to light.  Extraocular motions are normal.     CN V   Facial sensation intact.   Right corneal reflex: normal  Left corneal reflex: normal    CN VII   Facial expression full, symmetric.   Right facial weakness: none  Left facial weakness: none    CN  VIII   Hearing: intact    CN IX, X   Palate: symmetric    CN XI   Right sternocleidomastoid strength: normal  Left sternocleidomastoid strength: normal    CN XII   Tongue: not atrophic  Tongue deviation: none    Motor Exam   Muscle bulk: normal  Right arm tone: normal  Left arm tone: normal  Right leg tone: normal  Left leg tone: normal    Strength   Strength 5/5 except as noted.     Sensory Exam   Light touch normal.     Gait, Coordination, and Reflexes     Gait  Gait: normal    Coordination   Finger to nose coordination: normal  Heel to shin coordination: normal    Reflexes   Right brachioradialis: 2+  Left brachioradialis: 2+  Right biceps: 2+  Left biceps: 2+  Right triceps: 2+  Left triceps: 2+  Right patellar: 2+  Left patellar: 2+  Right achilles: 2+  Left achilles: 2+  Right : 2+  Left : 2+      Assessment/Plan   Independent Review of Radiographic Studies:    I reviewed his CT scan done to/24/12 which does show quite a bit of spinal stenosis and listhesis at L4-L5.  I agree with the report.      Medical Decision Making:    Were sticking with nonoperative treatments.  He will get his second and possibly his third epidural block.  He will call the pain clinic for that.  We'll go ahead and start some low dose gabapentin at 100 mg twice a day and have him come back in about 3 months.  If he is not any better then we'll need to get a myelogram as he cannot have an MRI because of his pacemaker.      Eugenio was seen today for back pain.    Diagnoses and all orders for this visit:    Stenosis, spinal, lumbar    Degeneration of lumbar intervertebral disc    Other orders  -     gabapentin (NEURONTIN) 100 MG capsule; Take 1 capsule by mouth 2 (Two) Times a Day.    Return in about 3 months (around 7/17/2017).

## 2017-04-25 ENCOUNTER — LAB (OUTPATIENT)
Dept: LAB | Facility: HOSPITAL | Age: 77
End: 2017-04-25

## 2017-04-25 ENCOUNTER — TRANSCRIBE ORDERS (OUTPATIENT)
Dept: ADMINISTRATIVE | Facility: HOSPITAL | Age: 77
End: 2017-04-25

## 2017-04-25 ENCOUNTER — TRANSCRIBE ORDERS (OUTPATIENT)
Dept: PAIN MEDICINE | Facility: HOSPITAL | Age: 77
End: 2017-04-25

## 2017-04-25 DIAGNOSIS — M48.061 STENOSIS, SPINAL, LUMBAR: Primary | ICD-10-CM

## 2017-04-25 DIAGNOSIS — M51.36 DEGENERATION OF LUMBAR INTERVERTEBRAL DISC: ICD-10-CM

## 2017-04-25 DIAGNOSIS — I10 ESSENTIAL HYPERTENSION, MALIGNANT: Primary | ICD-10-CM

## 2017-04-25 DIAGNOSIS — E78.00 PURE HYPERCHOLESTEROLEMIA: ICD-10-CM

## 2017-04-25 DIAGNOSIS — I10 ESSENTIAL HYPERTENSION, MALIGNANT: ICD-10-CM

## 2017-04-25 DIAGNOSIS — E11.9 DIABETES MELLITUS WITHOUT COMPLICATION (HCC): ICD-10-CM

## 2017-04-25 LAB
ALBUMIN SERPL-MCNC: 3.9 G/DL (ref 3.5–5.2)
ALBUMIN/GLOB SERPL: 1 G/DL
ALP SERPL-CCNC: 68 U/L (ref 39–117)
ALT SERPL W P-5'-P-CCNC: 29 U/L (ref 1–41)
ANION GAP SERPL CALCULATED.3IONS-SCNC: 15.8 MMOL/L
AST SERPL-CCNC: 30 U/L (ref 1–40)
BACTERIA UR QL AUTO: ABNORMAL /HPF
BILIRUB SERPL-MCNC: 0.8 MG/DL (ref 0.1–1.2)
BILIRUB UR QL STRIP: NEGATIVE
BUN BLD-MCNC: 53 MG/DL (ref 8–23)
BUN/CREAT SERPL: 26.6 (ref 7–25)
CALCIUM SPEC-SCNC: 9.7 MG/DL (ref 8.6–10.5)
CHLORIDE SERPL-SCNC: 96 MMOL/L (ref 98–107)
CHOLEST SERPL-MCNC: 157 MG/DL (ref 0–200)
CLARITY UR: CLEAR
CO2 SERPL-SCNC: 25.2 MMOL/L (ref 22–29)
COLOR UR: YELLOW
CREAT BLD-MCNC: 1.99 MG/DL (ref 0.76–1.27)
GFR SERPL CREATININE-BSD FRML MDRD: 33 ML/MIN/1.73
GLOBULIN UR ELPH-MCNC: 4.1 GM/DL
GLUCOSE BLD-MCNC: 201 MG/DL (ref 65–99)
GLUCOSE UR STRIP-MCNC: NEGATIVE MG/DL
HBA1C MFR BLD: 8.7 % (ref 4.8–5.6)
HDLC SERPL-MCNC: 44 MG/DL (ref 40–60)
HGB UR QL STRIP.AUTO: NEGATIVE
HYALINE CASTS UR QL AUTO: ABNORMAL /LPF
KETONES UR QL STRIP: NEGATIVE
LDLC SERPL CALC-MCNC: 81 MG/DL (ref 0–100)
LDLC/HDLC SERPL: 1.84 {RATIO}
LEUKOCYTE ESTERASE UR QL STRIP.AUTO: ABNORMAL
NITRITE UR QL STRIP: NEGATIVE
PH UR STRIP.AUTO: 6 [PH] (ref 5–8)
POTASSIUM BLD-SCNC: 4.6 MMOL/L (ref 3.5–5.2)
PROT SERPL-MCNC: 8 G/DL (ref 6–8.5)
PROT UR QL STRIP: NEGATIVE
RBC # UR: ABNORMAL /HPF
REF LAB TEST METHOD: ABNORMAL
SODIUM BLD-SCNC: 137 MMOL/L (ref 136–145)
SP GR UR STRIP: 1.02 (ref 1–1.03)
SQUAMOUS #/AREA URNS HPF: ABNORMAL /HPF
TRIGL SERPL-MCNC: 161 MG/DL (ref 0–150)
TSH SERPL DL<=0.05 MIU/L-ACNC: 3.06 MIU/ML (ref 0.27–4.2)
UROBILINOGEN UR QL STRIP: ABNORMAL
VLDLC SERPL-MCNC: 32.2 MG/DL (ref 5–40)
WBC UR QL AUTO: ABNORMAL /HPF

## 2017-04-25 PROCEDURE — 84443 ASSAY THYROID STIM HORMONE: CPT | Performed by: INTERNAL MEDICINE

## 2017-04-25 PROCEDURE — 81001 URINALYSIS AUTO W/SCOPE: CPT | Performed by: INTERNAL MEDICINE

## 2017-04-25 PROCEDURE — 36415 COLL VENOUS BLD VENIPUNCTURE: CPT

## 2017-04-25 PROCEDURE — 83036 HEMOGLOBIN GLYCOSYLATED A1C: CPT | Performed by: INTERNAL MEDICINE

## 2017-04-25 PROCEDURE — 80061 LIPID PANEL: CPT | Performed by: INTERNAL MEDICINE

## 2017-04-25 PROCEDURE — 80053 COMPREHEN METABOLIC PANEL: CPT | Performed by: INTERNAL MEDICINE

## 2017-05-03 ENCOUNTER — ANESTHESIA EVENT (OUTPATIENT)
Dept: PAIN MEDICINE | Facility: HOSPITAL | Age: 77
End: 2017-05-03

## 2017-05-03 ENCOUNTER — HOSPITAL ENCOUNTER (OUTPATIENT)
Dept: GENERAL RADIOLOGY | Facility: HOSPITAL | Age: 77
Discharge: HOME | End: 2017-05-03

## 2017-05-03 ENCOUNTER — ANESTHESIA (OUTPATIENT)
Dept: PAIN MEDICINE | Facility: HOSPITAL | Age: 77
End: 2017-05-03

## 2017-05-03 ENCOUNTER — HOSPITAL ENCOUNTER (OUTPATIENT)
Dept: PAIN MEDICINE | Facility: HOSPITAL | Age: 77
Discharge: HOME | End: 2017-05-03
Admitting: NEUROLOGICAL SURGERY

## 2017-05-03 VITALS
HEART RATE: 60 BPM | SYSTOLIC BLOOD PRESSURE: 100 MMHG | RESPIRATION RATE: 16 BRPM | OXYGEN SATURATION: 95 % | DIASTOLIC BLOOD PRESSURE: 50 MMHG | TEMPERATURE: 97.6 F | HEIGHT: 71 IN | WEIGHT: 188 LBS | BODY MASS INDEX: 26.32 KG/M2

## 2017-05-03 DIAGNOSIS — M48.061 STENOSIS, SPINAL, LUMBAR: ICD-10-CM

## 2017-05-03 DIAGNOSIS — M51.36 DEGENERATION OF LUMBAR INTERVERTEBRAL DISC: ICD-10-CM

## 2017-05-03 DIAGNOSIS — R52 PAIN: ICD-10-CM

## 2017-05-03 LAB — GLUCOSE BLDC GLUCOMTR-MCNC: 166 MG/DL (ref 70–130)

## 2017-05-03 PROCEDURE — 25010000002 METHYLPREDNISOLONE PER 80 MG: Performed by: ANESTHESIOLOGY

## 2017-05-03 PROCEDURE — 77003 FLUOROGUIDE FOR SPINE INJECT: CPT

## 2017-05-03 PROCEDURE — C1755 CATHETER, INTRASPINAL: HCPCS

## 2017-05-03 PROCEDURE — 82962 GLUCOSE BLOOD TEST: CPT

## 2017-05-03 RX ORDER — LIDOCAINE HYDROCHLORIDE 10 MG/ML
1 INJECTION, SOLUTION INFILTRATION; PERINEURAL ONCE
Status: DISCONTINUED | OUTPATIENT
Start: 2017-05-03 | End: 2017-05-04 | Stop reason: HOSPADM

## 2017-05-03 RX ORDER — METHYLPREDNISOLONE ACETATE 80 MG/ML
80 INJECTION, SUSPENSION INTRA-ARTICULAR; INTRALESIONAL; INTRAMUSCULAR; SOFT TISSUE ONCE
Status: COMPLETED | OUTPATIENT
Start: 2017-05-03 | End: 2017-05-03

## 2017-05-03 RX ADMIN — METHYLPREDNISOLONE ACETATE 80 MG: 80 INJECTION, SUSPENSION INTRA-ARTICULAR; INTRALESIONAL; INTRAMUSCULAR; SOFT TISSUE at 08:53

## 2017-05-22 ENCOUNTER — OFFICE VISIT (OUTPATIENT)
Dept: NEUROLOGY | Facility: CLINIC | Age: 77
End: 2017-05-22

## 2017-05-22 VITALS
WEIGHT: 193 LBS | DIASTOLIC BLOOD PRESSURE: 68 MMHG | OXYGEN SATURATION: 95 % | BODY MASS INDEX: 27.02 KG/M2 | HEART RATE: 86 BPM | SYSTOLIC BLOOD PRESSURE: 117 MMHG | HEIGHT: 71 IN

## 2017-05-22 DIAGNOSIS — R26.81 GAIT INSTABILITY: ICD-10-CM

## 2017-05-22 DIAGNOSIS — F01.50 VASCULAR DEMENTIA WITHOUT BEHAVIORAL DISTURBANCE (HCC): ICD-10-CM

## 2017-05-22 DIAGNOSIS — I63.9 CEREBROVASCULAR ACCIDENT (CVA), UNSPECIFIED MECHANISM (HCC): ICD-10-CM

## 2017-05-22 DIAGNOSIS — G20 PARKINSON'S DISEASE (HCC): ICD-10-CM

## 2017-05-22 PROCEDURE — 99214 OFFICE O/P EST MOD 30 MIN: CPT | Performed by: PSYCHIATRY & NEUROLOGY

## 2017-05-22 RX ORDER — DONEPEZIL HYDROCHLORIDE 10 MG/1
10 TABLET, FILM COATED ORAL NIGHTLY
COMMUNITY

## 2017-06-05 ENCOUNTER — HOSPITAL ENCOUNTER (OUTPATIENT)
Dept: PHYSICAL THERAPY | Facility: HOSPITAL | Age: 77
Setting detail: THERAPIES SERIES
Discharge: HOME | End: 2017-06-05
Attending: PSYCHIATRY & NEUROLOGY

## 2017-06-05 DIAGNOSIS — Z74.09 IMPAIRED FUNCTIONAL MOBILITY, BALANCE, AND ENDURANCE: Primary | ICD-10-CM

## 2017-06-05 DIAGNOSIS — R26.2 DIFFICULTY WALKING: ICD-10-CM

## 2017-06-05 PROCEDURE — G8978 MOBILITY CURRENT STATUS: HCPCS

## 2017-06-05 PROCEDURE — 97110 THERAPEUTIC EXERCISES: CPT

## 2017-06-05 PROCEDURE — G8979 MOBILITY GOAL STATUS: HCPCS

## 2017-06-05 PROCEDURE — 97161 PT EVAL LOW COMPLEX 20 MIN: CPT

## 2017-06-05 NOTE — THERAPY EVALUATION
Outpatient Physical Therapy Ortho Initial Evaluation  [facility]     Patient Name: Eugenio Vargas  : 1940  MRN: [id number]  Today's Date: 2017      Visit Date: 2017    Patient Active Problem List   Diagnosis   • Dementia, vascular   • Parkinson's disease   • CHF (congestive heart failure)   • Acute renal failure   • Chronic kidney disease   • Leukocytosis   • Hyperkalemia   • Ischemic cardiomyopathy   • Acute on chronic systolic CHF (congestive heart failure)   • Acute gout   • Stroke   • Degeneration of lumbar intervertebral disc   • Stenosis, spinal, lumbar   • Diabetes mellitus   • Gait instability        Past Medical History:   Diagnosis Date   • Anxiety    • Arthritis    • Atrial tachycardia, paroxysmal    • Brain bleed     DEFBRILLATOR FIRED AND PT FELL   • CAD (coronary artery disease)    • CHF (congestive heart failure)    • CKD (chronic kidney disease)    • COPD (chronic obstructive pulmonary disease)    • DDD (degenerative disc disease), lumbar    • Dementia    • Diabetes mellitus    • Gout    • History of positive PPD     pt states positive reactor, but never diagnosed/treated for TB   • HLD (hyperlipidemia)    • HTN (hypertension)    • Hypothyroid    • Ischemic cardiomyopathy    • Mitral insufficiency    • SASHA (obstructive sleep apnea)    • Osteoarthritis    • PAF (paroxysmal atrial fibrillation)    • Pulmonary HTN    • Spinal stenosis    • Stroke     STROKE OCCURED DURING CARDIAC SURGERY IN ; MEMORY LOSS   • V-tach     Paroxysmal        Past Surgical History:   Procedure Laterality Date   • CHOLECYSTECTOMY     • COLONOSCOPY     • CORONARY ANGIOPLASTY WITH STENT PLACEMENT     • CORONARY ARTERY BYPASS GRAFT     • EYE SURGERY     • LUMBAR EPIDURAL INJECTION  2017   • MITRAL VALVE ANNULOPLASTY     • SHOULDER SURGERY     • SINUS SURGERY         Visit Dx:     ICD-10-CM ICD-9-CM   1. Impaired functional mobility, balance, and endurance Z74.09 V49.89   2. Difficulty walking  R26.2 719.7             Patient History       06/05/17 1400          History    Chief Complaint Balance Problems;Pain  -CN      Type of Pain Back pain  -CN      Date Current Problem(s) Began 04/05/17  -CN      Brief Description of Current Complaint Pt reports two month history of low back pain as well as increased frequency of falls secondary to PD. Pt reports falling 5-6 times over past year with no serious injuries. Pt would like to decrease low back pain and increase stability on feet. Pt reports taking medication for PD to decrease symptoms.   -CN      Onset Date- PT 6/5/17  -CN      Patient/Caregiver Goals Relieve pain;Improve mobility  -CN      Occupation/sports/leisure activities [place] and is active with household chores such as moving grass (uses ride on mower)  -CN      Pain     Pain Location Back  -CN      Pain at Present 4  -CN      Pain at Best 3  -CN      Pain at Worst 10  -CN      Pain Frequency Constant/continuous  -CN      Pain Description Aching  -CN      What Performance Factors Make the Current Problem(s) WORSE? Back is worse with coming up to stand; pt unsure when balance is worse  -CN      What Performance Factors Make the Current Problem(s) BETTER? Heat seemed to help low back  -CN      Is your sleep disturbed? No  -CN      Difficulties with ADL's? Difficulty with endurance with walking secondary to fatigue and low back pain, careful when picking objects up off floor, quick movements  -CN      Fall Risk Assessment    Any falls in the past year: Yes  -CN      Number of falls reported in the last 12 months 5-6  -CN      Factors that contributed to the fall: Lost balance  -CN      Daily Activities    Primary Language English  -CN      How does patient learn best? Reading  -CN      Teaching needs identified Home Exercise Program;Management of Condition;Falls Prevention  -CN      Pt Participated in POC and Goals Yes  -CN      Safety    Are you being hurt, hit, or frightened by anyone at  home or in your life? No  -CN      Are you being neglected by a caregiver No  -CN        User Key  (r) = Recorded By, (t) = Taken By, (c) = Cosigned By    Initials Name Provider Type    CN Lola Tolentino PT Physical Therapist                PT Ortho       06/05/17 1500    Posture/Observations    Alignment Options Forward head;Rounded shoulders;Scapular elevation;Scoliosis  -CN    Forward Head Mild  -CN    Rounded Shoulders Moderate  -CN    Scapular Elevation Left:;Elevated  -CN    Scoliosis Mild;Moderate  -CN    Posture/Observations Comments L lateral shift  -CN    Sensation    Sensation WNL? WFL  -CN    ROM (Range of Motion)    General ROM Detail Slightly decreased B hip ROM noted  -CN    Left Hip    Hip Flexion Gross Movement (4-/5) good minus  -CN    Hip ABduction Gross Movement (4/5) good  -CN    Hip ADduction Gross Movement (4-/5) good minus  -CN    Right Hip    Hip Flexion Gross Movement (4-/5) good minus  -CN    Hip ABduction Gross Movement (4/5) good  -CN    Hip ADduction Gross Movement (4-/5) good minus  -CN    Left Knee    Knee Extension Gross Movement (3+/5) fair plus  -CN    Knee Flexion Gross Movement (4-/5) good minus  -CN    Right Knee    Knee Extension Gross Movement (4-/5) good minus  -CN    Knee Flexion Gross Movement (4-/5) good minus  -CN    Left Ankle/Foot    Ankle PF Gross Movement (4/5) good  -CN    Ankle Dorsiflexion Gross Movement (3+/5) fair plus  -CN    Right Ankle/Foot    Ankle PF Gross Movement (4/5) good  -CN    Ankle Dorsiflexion Gross Movement (4/5) good  -CN    Lower Extremity Flexibility    Hamstrings Mildly limited  -CN    Hip Flexors Mildly limited  -CN    Hip External Rotators Mildly limited  -CN    Hip Internal Rotators Mildly limited  -CN    Gait Assessment/Treatment    Gait, Comment Foot slap on the R, increased lateral movement, impaired trunk rotation  -CN      User Key  (r) = Recorded By, (t) = Taken By, (c) = Cosigned By    Initials Name Provider Type    CN  Lola Tolentino, KIRILL Physical Therapist                            Therapy Education       06/05/17 1703          Therapy Education    Given HEP;Symptoms/condition management;Posture/body mechanics;Fall prevention and home safety   Eval findings, goals of PT, progression of disease  -CN      Program New  -CN      How Provided Verbal;Demonstration;Written  -CN      Provided to Patient  -CN      Level of Understanding Teach back education performed  -CN        User Key  (r) = Recorded By, (t) = Taken By, (c) = Cosigned By    Initials Name Provider Type    JORDAN Tolentino PT Physical Therapist                PT OP Goals       06/05/17 1700       PT Short Term Goals    STG Date to Achieve 06/26/17  -CN     STG 1 Pt will report 50% reduction in symptoms indicating increased safety with ADLs.   -CN     STG 1 Progress New  -CN     STG 2 Pt will be independent and compliant with HEP and symptom management including ROM, flexibility and core strengthening program in order to increase stability and minimize stress on affected tissues.    -CN     STG 2 Progress New  -CN     Long Term Goals    LTG Date to Achieve 07/17/17  -CN     LTG 1 Patient will demonstrate improved BLE strength to grossly 4/5 for improved function and ease with performing sit to stands and stair negotiation.  -CN     LTG 1 Progress New  -CN     LTG 2 Patient will demonstrate improved COTTER Balance score to at least 48/56 for improved function and safety with ADLs.  -CN     LTG 2 Progress New  -CN     LTG 3 Pt will report no falls since onset of treatment.   -CN     LTG 3 Progress New  -CN     Time Calculation    PT Goal Re-Cert Due Date 07/05/17  -CN       User Key  (r) = Recorded By, (t) = Taken By, (c) = Cosigned By    Initials Name Provider Type    JORDAN Tolentino, PT Physical Therapist                PT Assessment/Plan       06/05/17 1518       PT Assessment    Functional Limitations Decreased safety during functional  activities;Limitations in community activities;Impaired gait;Limitations in functional capacity and performance;Performance in leisure activities;Limitation in home management  -CN     Impairments Balance;Gait;Impaired flexibility;Muscle strength;Pain;Range of motion;Posture;Poor body mechanics  -CN     Assessment Comments Pt is a 77 year old male presenting to therapy with c/o low back pain and impaired balance. Referral from MD for balance difficulties secondary to PD and pt taking medications for this condition. Pt reports 5-6 falls in past year secondary to loosing balance and demonstrates impaired LE strength B, L greater that R, with most significant deficit into L ankle dorsiflexion. Pt has poor postural alignment with L shift today and demonstrates impaired gait pattern including L foot slap. Pt scored 42/56 on the Berry and ambulated 311' on the 2 min walk test. Pt is unable to rise from chair without UE usage and has difficulty with SLS and tandem stance. Pt would benefit from skilled PT to address the deficits and return to PLOF.   -CN     Please refer to paper survey for additional self-reported information Yes  -CN     Rehab Potential Good  -CN     Patient/caregiver participated in establishment of treatment plan and goals Yes  -CN     Patient would benefit from skilled therapy intervention Yes  -CN     PT Plan    PT Frequency 2x/week  -CN     Predicted Duration of Therapy Intervention (days/wks) 6 weeks  -CN     Planned CPT's? PT EVAL LOW COMPLEXITY: 00056;PT RE-EVAL: 97290;PT THER PROC EA 15 MIN: 15920;PT THER ACT EA 15 MIN: 40621;PT MANUAL THERAPY EA 15 MIN: 85096;PT NEUROMUSC RE-EDUCATION EA 15 MIN: 01266;PT GAIT TRAINING EA 15 MIN: 49924;PT HOT OR COLD PACK TREAT MCARE  -CN     Physical Therapy Interventions (Optional Details) neuromuscular re-education;stretching;home exercise program;patient/family education;postural re-education;lumbar stabilization;modalities;stair training;strengthening;ROM  (Range of Motion);gait training  -CN     PT Plan Comments PT to treat 2x/week for 6 weeks consisting of strengthening, stability and balance activities. Consider Nustep warm up, SL clams, tandem gait in parallel bars, high knee walking and sidestepping next visit.   -CN       User Key  (r) = Recorded By, (t) = Taken By, (c) = Cosigned By    Initials Name Provider Type    JORDAN Tolentino PT Physical Therapist                  Exercises       06/05/17 1700          Exercise 1    Exercise Name 1 SLS with UE support  -CN      Cueing 1 Verbal;Tactile   CGA with gait belt  -CN      Reps 1 3  -CN      Time (Seconds) 1 20  -CN      Exercise 2    Exercise Name 2 Heel raises  -CN      Cueing 2 Demo  -CN      Reps 2 20  -CN      Exercise 3    Exercise Name 3 Standing abduction  -CN      Cueing 3 Demo  -CN      Reps 3 10  -CN        User Key  (r) = Recorded By, (t) = Taken By, (c) = Cosigned By    Initials Name Provider Type    JORDAN Tolentino PT Physical Therapist                              Outcome Measures       06/05/17 1400          2 Minute Walk Test    Distance Ambulated in 2 Minutes 311  -CN      Berry Balance Scale    Berry Comments 42/56 indicating low falls risk  -CN      Functional Assessment    Outcome Measure Options 2 Minute Walk Test;Berry Balance  -CN        User Key  (r) = Recorded By, (t) = Taken By, (c) = Cosigned By    Initials Name Provider Type    JORDAN Tolentino PT Physical Therapist            Time Calculation:   Start Time: 1415  Stop Time: 1513  Time Calculation (min): 58 min     Therapy Charges for Today     Code Description Service Date Service Provider Modifiers Qty    69894104796 HC PT MOBILITY CURRENT 6/5/2017 Lola Tolentino, PT GP, CJ 1    99778455363 HC PT MOBILITY PROJECTED 6/5/2017 Lola Tolentino PT GP, CI 1    90771794316 HC PT THER PROC EA 15 MIN 6/5/2017 Lola Tolentino PT GP 1    48272008952 HC PT EVAL LOW COMPLEXITY 3 6/5/2017  Lola Tolentino, PT GP 1          PT G-Codes  PT Professional Judgement Used?: Yes  Outcome Measure Options: 2 Minute Walk Test, Berry Balance  Score: 2 min walk test=311'; Berry=42/56 indicating low falls risk  Functional Limitation: Mobility: Walking and moving around  Mobility: Walking and Moving Around Current Status (): At least 20 percent but less than 40 percent impaired, limited or restricted  Mobility: Walking and Moving Around Goal Status (): At least 1 percent but less than 20 percent impaired, limited or restricted         Lola Tolentino, PT  6/5/2017

## 2017-06-07 ENCOUNTER — APPOINTMENT (OUTPATIENT)
Dept: PHYSICAL THERAPY | Facility: HOSPITAL | Age: 77
End: 2017-06-07

## 2017-06-13 ENCOUNTER — APPOINTMENT (OUTPATIENT)
Dept: PHYSICAL THERAPY | Facility: HOSPITAL | Age: 77
End: 2017-06-13

## 2017-06-15 ENCOUNTER — HOSPITAL ENCOUNTER (OUTPATIENT)
Dept: PHYSICAL THERAPY | Facility: HOSPITAL | Age: 77
Setting detail: THERAPIES SERIES
Discharge: HOME | End: 2017-06-15

## 2017-06-15 DIAGNOSIS — R26.2 DIFFICULTY WALKING: ICD-10-CM

## 2017-06-15 DIAGNOSIS — Z74.09 IMPAIRED FUNCTIONAL MOBILITY, BALANCE, AND ENDURANCE: Primary | ICD-10-CM

## 2017-06-15 PROCEDURE — 97110 THERAPEUTIC EXERCISES: CPT

## 2017-06-15 NOTE — THERAPY TREATMENT NOTE
Outpatient Physical Therapy Ortho Treatment Note  [facility]     Patient Name: Eugenio Vargas  : 1940  MRN: [id number]  Today's Date: 6/15/2017      Visit Date: 06/15/2017    Visit Dx:    ICD-10-CM ICD-9-CM   1. Impaired functional mobility, balance, and endurance Z74.09 V49.89   2. Difficulty walking R26.2 719.7       Patient Active Problem List   Diagnosis   • Dementia, vascular   • Parkinson's disease   • CHF (congestive heart failure)   • Acute renal failure   • Chronic kidney disease   • Leukocytosis   • Hyperkalemia   • Ischemic cardiomyopathy   • Acute on chronic systolic CHF (congestive heart failure)   • Acute gout   • Stroke   • Degeneration of lumbar intervertebral disc   • Stenosis, spinal, lumbar   • Diabetes mellitus   • Gait instability        Past Medical History:   Diagnosis Date   • Anxiety    • Arthritis    • Atrial tachycardia, paroxysmal    • Brain bleed     DEFBRILLATOR FIRED AND PT FELL   • CAD (coronary artery disease)    • CHF (congestive heart failure)    • CKD (chronic kidney disease)    • COPD (chronic obstructive pulmonary disease)    • DDD (degenerative disc disease), lumbar    • Dementia    • Diabetes mellitus    • Gout    • History of positive PPD     pt states positive reactor, but never diagnosed/treated for TB   • HLD (hyperlipidemia)    • HTN (hypertension)    • Hypothyroid    • Ischemic cardiomyopathy    • Mitral insufficiency    • SASHA (obstructive sleep apnea)    • Osteoarthritis    • PAF (paroxysmal atrial fibrillation)    • Pulmonary HTN    • Spinal stenosis    • Stroke     STROKE OCCURED DURING CARDIAC SURGERY IN ; MEMORY LOSS   • V-tach     Paroxysmal        Past Surgical History:   Procedure Laterality Date   • CHOLECYSTECTOMY     • COLONOSCOPY     • CORONARY ANGIOPLASTY WITH STENT PLACEMENT     • CORONARY ARTERY BYPASS GRAFT     • EYE SURGERY     • LUMBAR EPIDURAL INJECTION  2017   • MITRAL VALVE ANNULOPLASTY     • SHOULDER SURGERY     • SINUS  SURGERY                               PT Assessment/Plan       06/15/17 1205       PT Assessment    Assessment Comments Pt returns for initial follow up after evaluation and reports performing HEP daily. Added supine strengthening exercsies and initiated balance exercises in parallel bars including lateral lunges and weight shifting for trunk rotation and increased step length. Pt tolerated exercsies well and required CGA with gait belt as he demonstrates unsteadiness at times.   -CN     PT Plan    PT Plan Comments Continue with Nustep warm up, balance exercises in parallel bars. Consider adding gait with head turns, dual tasking and stepping over cones next visit.   -CN       User Key  (r) = Recorded By, (t) = Taken By, (c) = Cosigned By    Initials Name Provider Type    CN Lola Tolentino, PT Physical Therapist                    Exercises       06/15/17 1100          Subjective Comments    Subjective Comments I am feeling ok today, I woke up with some low back pain today but thats every day. I have been doing my exercises at home.   -CN      Subjective Pain    Able to rate subjective pain? yes  -CN      Pre-Treatment Pain Level 0  -CN      Exercise 2    Exercise Name 2 Heel raises  -CN      Cueing 2 Demo  -CN      Reps 2 20  -CN      Exercise 3    Exercise Name 3 Standing abduction  -CN      Cueing 3 Demo  -CN      Sets 3 2  -CN      Reps 3 10  -CN      Exercise 4    Exercise Name 4 Nustep, L5 with UEs  -CN      Cueing 4 Verbal  -CN      Time (Minutes) 4 6  -CN      Exercise 5    Exercise Name 5 LTR  -CN      Cueing 5 Verbal  -CN      Reps 5 15  -CN      Time (Seconds) 5 5  -CN      Exercise 6    Exercise Name 6 Bridges  -CN      Cueing 6 Verbal  -CN      Sets 6 2  -CN      Reps 6 10  -CN      Exercise 7    Exercise Name 7 Sl clamshells  -CN      Cueing 7 Demo  -CN      Sets 7 2  -CN      Reps 7 10  -CN      Exercise 8    Exercise Name 8 // bars: tandem, high knees, sidestepping  -CN      Cueing 8 Verbal    CGA with gait belt  -CN      Reps 8 4   laps  -CN      Exercise 9    Exercise Name 9 Blue foam: marches and heel raises  -CN      Cueing 9 Verbal  -CN      Reps 9 20  -CN      Exercise 10    Exercise Name 10 Lateral lunges with UE abduction and head turns  -CN      Cueing 10 Demo   CGA with gait belt  -CN      Reps 10 15  -CN      Exercise 11    Exercise Name 11 Stagger stance weight shift with arm swing  -CN      Cueing 11 Demo   CGA with gait belt  -CN      Reps 11 15  -CN        User Key  (r) = Recorded By, (t) = Taken By, (c) = Cosigned By    Initials Name Provider Type    JORDAN Tolentino, PT Physical Therapist                               PT OP Goals       06/15/17 1100       PT Short Term Goals    STG Date to Achieve 06/26/17  -CN     STG 1 Pt will report 50% reduction in symptoms indicating increased safety with ADLs.   -CN     STG 1 Progress Ongoing  -CN     STG 2 Pt will be independent and compliant with HEP and symptom management including ROM, flexibility and core strengthening program in order to increase stability and minimize stress on affected tissues.    -CN     STG 2 Progress Progressing  -CN     STG 2 Progress Comments Pt reports performing HEP daily.   -CN     Long Term Goals    LTG Date to Achieve 07/17/17  -CN     LTG 1 Patient will demonstrate improved BLE strength to grossly 4/5 for improved function and ease with performing sit to stands and stair negotiation.  -CN     LTG 1 Progress Ongoing  -CN     LTG 2 Patient will demonstrate improved COTTER Balance score to at least 48/56 for improved function and safety with ADLs.  -CN     LTG 2 Progress Ongoing  -CN     LTG 3 Pt will report no falls since onset of treatment.   -CN     LTG 3 Progress Ongoing  -CN       User Key  (r) = Recorded By, (t) = Taken By, (c) = Cosigned By    Initials Name Provider Type    JORDAN Tolentino, PT Physical Therapist                Therapy Education       06/15/17 1108          Therapy Education     Given HEP;Symptoms/condition management;Posture/body mechanics;Fall prevention and home safety  -CN      Program Progressed  -CN      How Provided Verbal;Demonstration  -CN      Provided to Patient  -CN      Level of Understanding Teach back education performed  -CN        User Key  (r) = Recorded By, (t) = Taken By, (c) = Cosigned By    Initials Name Provider Type    CN oLla Tolentino, PT Physical Therapist                Time Calculation:   Start Time: 1100  Stop Time: 1145  Time Calculation (min): 45 min    Therapy Charges for Today     Code Description Service Date Service Provider Modifiers Qty    51418842843  PT THER PROC EA 15 MIN 6/15/2017 Lola Tolentino, PT GP 3                    Lola Tolentino PT  6/15/2017

## 2017-06-19 ENCOUNTER — APPOINTMENT (OUTPATIENT)
Dept: PHYSICAL THERAPY | Facility: HOSPITAL | Age: 77
End: 2017-06-19

## 2017-06-22 ENCOUNTER — APPOINTMENT (OUTPATIENT)
Dept: PHYSICAL THERAPY | Facility: HOSPITAL | Age: 77
End: 2017-06-22

## 2017-06-26 ENCOUNTER — HOSPITAL ENCOUNTER (OUTPATIENT)
Dept: PHYSICAL THERAPY | Facility: HOSPITAL | Age: 77
Setting detail: THERAPIES SERIES
Discharge: HOME | End: 2017-06-26

## 2017-06-26 DIAGNOSIS — R26.2 DIFFICULTY WALKING: ICD-10-CM

## 2017-06-26 DIAGNOSIS — Z74.09 IMPAIRED FUNCTIONAL MOBILITY, BALANCE, AND ENDURANCE: Primary | ICD-10-CM

## 2017-06-26 PROCEDURE — 97112 NEUROMUSCULAR REEDUCATION: CPT

## 2017-06-26 PROCEDURE — 97110 THERAPEUTIC EXERCISES: CPT

## 2017-06-26 NOTE — THERAPY TREATMENT NOTE
Outpatient Physical Therapy Ortho Treatment Note  [facility]     Patient Name: Eugenio Vargas  : 1940  MRN: [id number]  Today's Date: 2017      Visit Date: 2017    Visit Dx:    ICD-10-CM ICD-9-CM   1. Impaired functional mobility, balance, and endurance Z74.09 V49.89   2. Difficulty walking R26.2 719.7       Patient Active Problem List   Diagnosis   • Dementia, vascular   • Parkinson's disease   • CHF (congestive heart failure)   • Acute renal failure   • Chronic kidney disease   • Leukocytosis   • Hyperkalemia   • Ischemic cardiomyopathy   • Acute on chronic systolic CHF (congestive heart failure)   • Acute gout   • Stroke   • Degeneration of lumbar intervertebral disc   • Stenosis, spinal, lumbar   • Diabetes mellitus   • Gait instability        Past Medical History:   Diagnosis Date   • Anxiety    • Arthritis    • Atrial tachycardia, paroxysmal    • Brain bleed     DEFBRILLATOR FIRED AND PT FELL   • CAD (coronary artery disease)    • CHF (congestive heart failure)    • CKD (chronic kidney disease)    • COPD (chronic obstructive pulmonary disease)    • DDD (degenerative disc disease), lumbar    • Dementia    • Diabetes mellitus    • Gout    • History of positive PPD     pt states positive reactor, but never diagnosed/treated for TB   • HLD (hyperlipidemia)    • HTN (hypertension)    • Hypothyroid    • Ischemic cardiomyopathy    • Mitral insufficiency    • SASHA (obstructive sleep apnea)    • Osteoarthritis    • PAF (paroxysmal atrial fibrillation)    • Pulmonary HTN    • Spinal stenosis    • Stroke     STROKE OCCURED DURING CARDIAC SURGERY IN ; MEMORY LOSS   • V-tach     Paroxysmal        Past Surgical History:   Procedure Laterality Date   • CHOLECYSTECTOMY     • COLONOSCOPY     • CORONARY ANGIOPLASTY WITH STENT PLACEMENT     • CORONARY ARTERY BYPASS GRAFT     • EYE SURGERY     • LUMBAR EPIDURAL INJECTION  2017   • MITRAL VALVE ANNULOPLASTY     • SHOULDER SURGERY     • SINUS  SURGERY                               PT Assessment/Plan       06/26/17 1048       PT Assessment    Assessment Comments Pt reports continued balance impairments which is notices with ADLs and with ambulation. Pt able to tolerate 30 min of standing/ambulating activities, however required seated/supine exercises at end of session seconday to foot pain due to gout flare up last week. Pt continues to require UE use at // bars during balancec activities, however strength is improving.   -CN     PT Plan    PT Plan Comments Consider adding dual tasking with ambulation, stepping over cones and ball toss onto trampoline next visit.   -CN       User Key  (r) = Recorded By, (t) = Taken By, (c) = Cosigned By    Initials Name Provider Type    CN Lola Tolentino, PT Physical Therapist                    Exercises       06/26/17 0900          Subjective Comments    Subjective Comments I am doing ok, no pain outside of normal. The exercises are good.   -CN      Subjective Pain    Able to rate subjective pain? yes  -CN      Pre-Treatment Pain Level 0  -CN      Exercise 1    Exercise Name 1 SLS with UE support  -CN      Cueing 1 Verbal;Tactile   CGA with gait belt  -CN      Reps 1 3  -CN      Time (Seconds) 1 20  -CN      Exercise 2    Exercise Name 2 Heel raises  -CN      Cueing 2 Demo  -CN      Reps 2 20  -CN      Exercise 3    Exercise Name 3 Standing abduction  -CN      Cueing 3 Demo  -CN      Sets 3 2  -CN      Reps 3 10  -CN      Exercise 4    Exercise Name 4 Nustep, L5 with UEs  -CN      Cueing 4 Verbal  -CN      Time (Minutes) 4 6  -CN      Exercise 5    Exercise Name 5 LTR  -CN      Cueing 5 Verbal  -CN      Reps 5 15  -CN      Time (Seconds) 5 5  -CN      Exercise 6    Exercise Name 6 Bridges  -CN      Cueing 6 Verbal  -CN      Sets 6 2  -CN      Reps 6 10  -CN      Exercise 7    Exercise Name 7 Sl clamshells  -CN      Cueing 7 Demo  -CN      Sets 7 2  -CN      Reps 7 10  -CN      Exercise 8    Exercise Name 8 //  bars: tandem, high knees, sidestepping  -CN      Cueing 8 Verbal   CGA with gait belt  -CN      Reps 8 4   laps  -CN      Exercise 9    Exercise Name 9 Blue foam: marches and heel raises  -CN      Cueing 9 Verbal  -CN      Reps 9 20  -CN      Exercise 10    Exercise Name 10 Lateral lunges with UE abduction and head turns  -CN      Cueing 10 Demo   CGA with gait belt  -CN      Reps 10 15  -CN      Exercise 11    Exercise Name 11 Stagger stance weight shift with arm swing  -CN      Reps 11 15  -CN      Exercise 12    Exercise Name 12 LAQ  -CN      Cueing 12 Demo  -CN      Weights/Plates 12 3  -CN      Sets 12 2  -CN      Reps 12 10  -CN      Exercise 13    Exercise Name 13 gait with head turns  -CN      Cueing 13 Verbal   CGA with gait belt  -CN      Time (Minutes) 13 5  -CN        User Key  (r) = Recorded By, (t) = Taken By, (c) = Cosigned By    Initials Name Provider Type    CN Lola Tolentino, PT Physical Therapist                               PT OP Goals       06/26/17 1000       PT Short Term Goals    STG Date to Achieve 06/26/17  -CN     STG 1 Pt will report 50% reduction in symptoms indicating increased safety with ADLs.   -CN     STG 1 Progress Ongoing  -CN     STG 2 Pt will be independent and compliant with HEP and symptom management including ROM, flexibility and core strengthening program in order to increase stability and minimize stress on affected tissues.    -CN     STG 2 Progress Met  -CN     Long Term Goals    LTG Date to Achieve 07/17/17  -CN     LTG 1 Patient will demonstrate improved BLE strength to grossly 4/5 for improved function and ease with performing sit to stands and stair negotiation.  -CN     LTG 1 Progress Ongoing  -CN     LTG 1 Progress Comments Pt demonstrates increased endurance with standing exercises.   -CN     LTG 2 Patient will demonstrate improved COTTER Balance score to at least 48/56 for improved function and safety with ADLs.  -CN     LTG 2 Progress Ongoing  -CN     LTG  3 Pt will report no falls since onset of treatment.   -CN     LTG 3 Progress Ongoing  -CN       User Key  (r) = Recorded By, (t) = Taken By, (c) = Cosigned By    Initials Name Provider Type    JORDAN Tolentino PT Physical Therapist                Therapy Education       06/26/17 0948          Therapy Education    Given HEP;Symptoms/condition management;Posture/body mechanics;Fall prevention and home safety  -CN      Program Progressed  -CN      How Provided Verbal;Demonstration  -CN      Provided to Patient  -CN      Level of Understanding Teach back education performed  -CN        User Key  (r) = Recorded By, (t) = Taken By, (c) = Cosigned By    Initials Name Provider Type    JORDAN Tolentino PT Physical Therapist                Time Calculation:   Start Time: 0945  Stop Time: 1030  Time Calculation (min): 45 min    Therapy Charges for Today     Code Description Service Date Service Provider Modifiers Qty    63905602806  PT THER PROC EA 15 MIN 6/26/2017 Lola Tolentino, PT GP 2    [id number]  PT NEUROMUSC RE EDUCATION EA 15 MIN 6/26/2017 Lola Tolentino PT GP 1                    Lola Tolentino PT  6/26/2017

## 2017-06-28 RX ORDER — GABAPENTIN 100 MG/1
CAPSULE ORAL
Qty: 180 CAPSULE | Refills: 3 | Status: SHIPPED | OUTPATIENT
Start: 2017-06-28

## 2017-08-01 ENCOUNTER — LAB (OUTPATIENT)
Dept: LAB | Facility: HOSPITAL | Age: 77
End: 2017-08-01

## 2017-08-01 ENCOUNTER — TRANSCRIBE ORDERS (OUTPATIENT)
Dept: ADMINISTRATIVE | Facility: HOSPITAL | Age: 77
End: 2017-08-01

## 2017-08-01 DIAGNOSIS — M10.9 GOUT, UNSPECIFIED: Primary | ICD-10-CM

## 2017-08-01 DIAGNOSIS — M10.9 GOUT, UNSPECIFIED: ICD-10-CM

## 2017-08-01 LAB — URATE SERPL-MCNC: 11.5 MG/DL (ref 3.4–7)

## 2017-08-01 PROCEDURE — 84550 ASSAY OF BLOOD/URIC ACID: CPT | Performed by: INTERNAL MEDICINE

## 2017-08-01 PROCEDURE — 36415 COLL VENOUS BLD VENIPUNCTURE: CPT

## 2017-08-04 ENCOUNTER — LAB (OUTPATIENT)
Dept: LAB | Facility: HOSPITAL | Age: 77
End: 2017-08-04

## 2017-08-04 ENCOUNTER — TRANSCRIBE ORDERS (OUTPATIENT)
Dept: ADMINISTRATIVE | Facility: HOSPITAL | Age: 77
End: 2017-08-04

## 2017-08-04 DIAGNOSIS — I10 ESSENTIAL HYPERTENSION, MALIGNANT: ICD-10-CM

## 2017-08-04 DIAGNOSIS — E11.9 DIABETES MELLITUS WITHOUT COMPLICATION (HCC): ICD-10-CM

## 2017-08-04 DIAGNOSIS — E03.9 UNSPECIFIED HYPOTHYROIDISM: ICD-10-CM

## 2017-08-04 DIAGNOSIS — E78.5 HYPERLIPIDEMIA, UNSPECIFIED HYPERLIPIDEMIA TYPE: ICD-10-CM

## 2017-08-04 DIAGNOSIS — E78.5 HYPERLIPIDEMIA, UNSPECIFIED HYPERLIPIDEMIA TYPE: Primary | ICD-10-CM

## 2017-08-04 LAB
ALBUMIN SERPL-MCNC: 3.7 G/DL (ref 3.5–5.2)
ALBUMIN/GLOB SERPL: 1.1 G/DL
ALP SERPL-CCNC: 58 U/L (ref 39–117)
ALT SERPL W P-5'-P-CCNC: 45 U/L (ref 1–41)
ANION GAP SERPL CALCULATED.3IONS-SCNC: 10.7 MMOL/L
AST SERPL-CCNC: 30 U/L (ref 1–40)
BASOPHILS # BLD AUTO: 0.03 10*3/MM3 (ref 0–0.2)
BASOPHILS NFR BLD AUTO: 0.2 % (ref 0–1.5)
BILIRUB SERPL-MCNC: 0.5 MG/DL (ref 0.1–1.2)
BUN BLD-MCNC: 44 MG/DL (ref 8–23)
BUN/CREAT SERPL: 26 (ref 7–25)
CALCIUM SPEC-SCNC: 9.7 MG/DL (ref 8.6–10.5)
CHLORIDE SERPL-SCNC: 100 MMOL/L (ref 98–107)
CHOLEST SERPL-MCNC: 119 MG/DL (ref 0–200)
CO2 SERPL-SCNC: 29.3 MMOL/L (ref 22–29)
CREAT BLD-MCNC: 1.69 MG/DL (ref 0.76–1.27)
DEPRECATED RDW RBC AUTO: 52.8 FL (ref 37–54)
EOSINOPHIL # BLD AUTO: 0.47 10*3/MM3 (ref 0–0.7)
EOSINOPHIL NFR BLD AUTO: 3.8 % (ref 0.3–6.2)
ERYTHROCYTE [DISTWIDTH] IN BLOOD BY AUTOMATED COUNT: 14.6 % (ref 11.5–14.5)
GFR SERPL CREATININE-BSD FRML MDRD: 40 ML/MIN/1.73
GLOBULIN UR ELPH-MCNC: 3.4 GM/DL
GLUCOSE BLD-MCNC: 147 MG/DL (ref 65–99)
HCT VFR BLD AUTO: 50.5 % (ref 40.4–52.2)
HDLC SERPL-MCNC: 38 MG/DL (ref 40–60)
HGB BLD-MCNC: 15.8 G/DL (ref 13.7–17.6)
IMM GRANULOCYTES # BLD: 0.45 10*3/MM3 (ref 0–0.03)
IMM GRANULOCYTES NFR BLD: 3.6 % (ref 0–0.5)
LDLC SERPL CALC-MCNC: 56 MG/DL (ref 0–100)
LDLC/HDLC SERPL: 1.47 {RATIO}
LYMPHOCYTES # BLD AUTO: 1.27 10*3/MM3 (ref 0.9–4.8)
LYMPHOCYTES NFR BLD AUTO: 10.2 % (ref 19.6–45.3)
MCH RBC QN AUTO: 31.2 PG (ref 27–32.7)
MCHC RBC AUTO-ENTMCNC: 31.3 G/DL (ref 32.6–36.4)
MCV RBC AUTO: 99.6 FL (ref 79.8–96.2)
MONOCYTES # BLD AUTO: 1.22 10*3/MM3 (ref 0.2–1.2)
MONOCYTES NFR BLD AUTO: 9.8 % (ref 5–12)
NEUTROPHILS # BLD AUTO: 9.05 10*3/MM3 (ref 1.9–8.1)
NEUTROPHILS NFR BLD AUTO: 72.4 % (ref 42.7–76)
PLATELET # BLD AUTO: 205 10*3/MM3 (ref 140–500)
PMV BLD AUTO: 9.7 FL (ref 6–12)
POTASSIUM BLD-SCNC: 4.8 MMOL/L (ref 3.5–5.2)
PROT SERPL-MCNC: 7.1 G/DL (ref 6–8.5)
RBC # BLD AUTO: 5.07 10*6/MM3 (ref 4.6–6)
SODIUM BLD-SCNC: 140 MMOL/L (ref 136–145)
TRIGL SERPL-MCNC: 125 MG/DL (ref 0–150)
TSH SERPL DL<=0.05 MIU/L-ACNC: 2.12 MIU/ML (ref 0.27–4.2)
VLDLC SERPL-MCNC: 25 MG/DL (ref 5–40)
WBC NRBC COR # BLD: 12.49 10*3/MM3 (ref 4.5–10.7)

## 2017-08-04 PROCEDURE — 84443 ASSAY THYROID STIM HORMONE: CPT | Performed by: INTERNAL MEDICINE

## 2017-08-04 PROCEDURE — 80053 COMPREHEN METABOLIC PANEL: CPT | Performed by: INTERNAL MEDICINE

## 2017-08-04 PROCEDURE — 85025 COMPLETE CBC W/AUTO DIFF WBC: CPT | Performed by: INTERNAL MEDICINE

## 2017-08-04 PROCEDURE — 80061 LIPID PANEL: CPT | Performed by: INTERNAL MEDICINE

## 2017-08-04 PROCEDURE — 36415 COLL VENOUS BLD VENIPUNCTURE: CPT

## 2017-08-16 NOTE — TELEPHONE ENCOUNTER
----- Message from Candie Ram sent at 8/16/2017 11:21 AM EDT -----  Contact: [phone]  Pt needs refilled:  memantine (NAMENDA) 10 MG tablet [

## 2017-08-17 RX ORDER — MEMANTINE HYDROCHLORIDE 10 MG/1
10 TABLET ORAL DAILY
Qty: 30 TABLET | Refills: 3 | Status: SHIPPED | OUTPATIENT
Start: 2017-08-17

## 2017-08-25 ENCOUNTER — TRANSCRIBE ORDERS (OUTPATIENT)
Dept: ADMINISTRATIVE | Facility: HOSPITAL | Age: 77
End: 2017-08-25

## 2017-08-25 ENCOUNTER — LAB (OUTPATIENT)
Dept: LAB | Facility: HOSPITAL | Age: 77
End: 2017-08-25

## 2017-08-25 DIAGNOSIS — M79.10 MYALGIA: Primary | ICD-10-CM

## 2017-08-25 DIAGNOSIS — M79.10 MYALGIA: ICD-10-CM

## 2017-08-25 DIAGNOSIS — M25.50 ARTHRALGIA, UNSPECIFIED JOINT: ICD-10-CM

## 2017-08-25 LAB
25(OH)D3 SERPL-MCNC: 28.4 NG/ML (ref 30–100)
ALBUMIN SERPL-MCNC: 3.9 G/DL (ref 3.5–5.2)
ALBUMIN/GLOB SERPL: 1.1 G/DL
ALP SERPL-CCNC: 67 U/L (ref 39–117)
ALT SERPL W P-5'-P-CCNC: 29 U/L (ref 1–41)
ANION GAP SERPL CALCULATED.3IONS-SCNC: 14.5 MMOL/L
AST SERPL-CCNC: 37 U/L (ref 1–40)
BASOPHILS # BLD AUTO: 0.1 10*3/MM3 (ref 0–0.2)
BASOPHILS NFR BLD AUTO: 1 % (ref 0–1.5)
BILIRUB SERPL-MCNC: 0.7 MG/DL (ref 0.1–1.2)
BUN BLD-MCNC: 33 MG/DL (ref 8–23)
BUN/CREAT SERPL: 18.1 (ref 7–25)
CALCIUM SPEC-SCNC: 9.4 MG/DL (ref 8.6–10.5)
CHLORIDE SERPL-SCNC: 100 MMOL/L (ref 98–107)
CHROMATIN AB SERPL-ACNC: 46.5 IU/ML (ref 0–14)
CK SERPL-CCNC: 58 U/L (ref 20–200)
CO2 SERPL-SCNC: 26.5 MMOL/L (ref 22–29)
CREAT BLD-MCNC: 1.82 MG/DL (ref 0.76–1.27)
DEPRECATED RDW RBC AUTO: 54.9 FL (ref 37–54)
EOSINOPHIL # BLD AUTO: 0.45 10*3/MM3 (ref 0–0.7)
EOSINOPHIL NFR BLD AUTO: 4.6 % (ref 0.3–6.2)
ERYTHROCYTE [DISTWIDTH] IN BLOOD BY AUTOMATED COUNT: 15.3 % (ref 11.5–14.5)
ERYTHROCYTE [SEDIMENTATION RATE] IN BLOOD: 5 MM/HR (ref 0–20)
GFR SERPL CREATININE-BSD FRML MDRD: 36 ML/MIN/1.73
GLOBULIN UR ELPH-MCNC: 3.6 GM/DL
GLUCOSE BLD-MCNC: 167 MG/DL (ref 65–99)
HCT VFR BLD AUTO: 49.6 % (ref 40.4–52.2)
HGB BLD-MCNC: 16 G/DL (ref 13.7–17.6)
IMM GRANULOCYTES # BLD: 0.13 10*3/MM3 (ref 0–0.03)
IMM GRANULOCYTES NFR BLD: 1.3 % (ref 0–0.5)
LYMPHOCYTES # BLD AUTO: 1.4 10*3/MM3 (ref 0.9–4.8)
LYMPHOCYTES NFR BLD AUTO: 14.5 % (ref 19.6–45.3)
MCH RBC QN AUTO: 31.7 PG (ref 27–32.7)
MCHC RBC AUTO-ENTMCNC: 32.3 G/DL (ref 32.6–36.4)
MCV RBC AUTO: 98.2 FL (ref 79.8–96.2)
MONOCYTES # BLD AUTO: 1.02 10*3/MM3 (ref 0.2–1.2)
MONOCYTES NFR BLD AUTO: 10.5 % (ref 5–12)
NEUTROPHILS # BLD AUTO: 6.58 10*3/MM3 (ref 1.9–8.1)
NEUTROPHILS NFR BLD AUTO: 68.1 % (ref 42.7–76)
NRBC BLD MANUAL-RTO: 0 /100 WBC (ref 0–0)
PLATELET # BLD AUTO: 222 10*3/MM3 (ref 140–500)
PMV BLD AUTO: 9.4 FL (ref 6–12)
POTASSIUM BLD-SCNC: 4.5 MMOL/L (ref 3.5–5.2)
PROT SERPL-MCNC: 7.5 G/DL (ref 6–8.5)
RBC # BLD AUTO: 5.05 10*6/MM3 (ref 4.6–6)
SODIUM BLD-SCNC: 141 MMOL/L (ref 136–145)
T3 SERPL-MCNC: 83.5 NG/DL (ref 80–200)
T4 SERPL-MCNC: 8.91 MCG/DL (ref 4.5–11.7)
TSH SERPL DL<=0.05 MIU/L-ACNC: 2.05 MIU/ML (ref 0.27–4.2)
VIT B12 BLD-MCNC: 1791 PG/ML (ref 211–946)
WBC NRBC COR # BLD: 9.68 10*3/MM3 (ref 4.5–10.7)

## 2017-08-25 PROCEDURE — 80053 COMPREHEN METABOLIC PANEL: CPT | Performed by: INTERNAL MEDICINE

## 2017-08-25 PROCEDURE — 82306 VITAMIN D 25 HYDROXY: CPT | Performed by: INTERNAL MEDICINE

## 2017-08-25 PROCEDURE — 86431 RHEUMATOID FACTOR QUANT: CPT | Performed by: INTERNAL MEDICINE

## 2017-08-25 PROCEDURE — 84436 ASSAY OF TOTAL THYROXINE: CPT | Performed by: INTERNAL MEDICINE

## 2017-08-25 PROCEDURE — 84480 ASSAY TRIIODOTHYRONINE (T3): CPT | Performed by: INTERNAL MEDICINE

## 2017-08-25 PROCEDURE — 85652 RBC SED RATE AUTOMATED: CPT | Performed by: INTERNAL MEDICINE

## 2017-08-25 PROCEDURE — 84443 ASSAY THYROID STIM HORMONE: CPT | Performed by: INTERNAL MEDICINE

## 2017-08-25 PROCEDURE — 82550 ASSAY OF CK (CPK): CPT | Performed by: INTERNAL MEDICINE

## 2017-08-25 PROCEDURE — 86038 ANTINUCLEAR ANTIBODIES: CPT | Performed by: INTERNAL MEDICINE

## 2017-08-25 PROCEDURE — 82607 VITAMIN B-12: CPT | Performed by: INTERNAL MEDICINE

## 2017-08-25 PROCEDURE — 85025 COMPLETE CBC W/AUTO DIFF WBC: CPT | Performed by: INTERNAL MEDICINE

## 2017-08-25 PROCEDURE — 36415 COLL VENOUS BLD VENIPUNCTURE: CPT

## 2017-08-28 LAB — ANA SER QL: NEGATIVE

## 2017-09-03 ENCOUNTER — APPOINTMENT (OUTPATIENT)
Dept: GENERAL RADIOLOGY | Facility: HOSPITAL | Age: 77
End: 2017-09-03

## 2017-09-03 ENCOUNTER — HOSPITAL ENCOUNTER (EMERGENCY)
Facility: HOSPITAL | Age: 77
Discharge: HOME | End: 2017-09-03
Attending: EMERGENCY MEDICINE | Admitting: EMERGENCY MEDICINE

## 2017-09-03 ENCOUNTER — APPOINTMENT (OUTPATIENT)
Dept: CT IMAGING | Facility: HOSPITAL | Age: 77
End: 2017-09-03

## 2017-09-03 VITALS
OXYGEN SATURATION: 96 % | WEIGHT: 183 LBS | DIASTOLIC BLOOD PRESSURE: 64 MMHG | SYSTOLIC BLOOD PRESSURE: 115 MMHG | TEMPERATURE: 98 F | HEIGHT: 71 IN | RESPIRATION RATE: 17 BRPM | HEART RATE: 62 BPM | BODY MASS INDEX: 25.62 KG/M2

## 2017-09-03 DIAGNOSIS — M25.462 KNEE EFFUSION, LEFT: Primary | ICD-10-CM
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
## 2017-09-03 DIAGNOSIS — M79.605 LOWER EXTREMITY PAIN, LEFT: ICD-10-CM

## 2017-09-03 LAB
BACTERIA UR QL AUTO: NORMAL /HPF
BILIRUB UR QL STRIP: NEGATIVE
CLARITY UR: CLEAR
COLOR UR: YELLOW
GLUCOSE UR STRIP-MCNC: NEGATIVE MG/DL
HGB UR QL STRIP.AUTO: NEGATIVE
HYALINE CASTS UR QL AUTO: NORMAL /LPF
KETONES UR QL STRIP: NEGATIVE
LEUKOCYTE ESTERASE UR QL STRIP.AUTO: ABNORMAL
NITRITE UR QL STRIP: NEGATIVE
PH UR STRIP.AUTO: 5.5 [PH] (ref 5–8)
PROT UR QL STRIP: NEGATIVE
RBC # UR: NORMAL /HPF
REF LAB TEST METHOD: NORMAL
SP GR UR STRIP: 1.01 (ref 1–1.03)
SQUAMOUS #/AREA URNS HPF: NORMAL /HPF
UROBILINOGEN UR QL STRIP: ABNORMAL
WBC UR QL AUTO: NORMAL /HPF

## 2017-09-03 PROCEDURE — 96374 THER/PROPH/DIAG INJ IV PUSH: CPT

## 2017-09-03 PROCEDURE — 99284 EMERGENCY DEPT VISIT MOD MDM: CPT

## 2017-09-03 PROCEDURE — 25010000002 ONDANSETRON PER 1 MG: Performed by: NURSE PRACTITIONER

## 2017-09-03 PROCEDURE — 81001 URINALYSIS AUTO W/SCOPE: CPT | Performed by: EMERGENCY MEDICINE

## 2017-09-03 PROCEDURE — 90715 TDAP VACCINE 7 YRS/> IM: CPT | Performed by: NURSE PRACTITIONER

## 2017-09-03 PROCEDURE — 73502 X-RAY EXAM HIP UNI 2-3 VIEWS: CPT

## 2017-09-03 PROCEDURE — 96375 TX/PRO/DX INJ NEW DRUG ADDON: CPT

## 2017-09-03 PROCEDURE — 25010000002 MORPHINE PER 10 MG: Performed by: NURSE PRACTITIONER

## 2017-09-03 PROCEDURE — 70450 CT HEAD/BRAIN W/O DYE: CPT

## 2017-09-03 PROCEDURE — 90471 IMMUNIZATION ADMIN: CPT | Performed by: NURSE PRACTITIONER

## 2017-09-03 PROCEDURE — 73560 X-RAY EXAM OF KNEE 1 OR 2: CPT

## 2017-09-03 PROCEDURE — 25010000002 TDAP 5-2.5-18.5 LF-MCG/0.5 SUSPENSION: Performed by: NURSE PRACTITIONER

## 2017-09-03 RX ORDER — ALLOPURINOL 100 MG/1
100 TABLET ORAL DAILY
COMMUNITY

## 2017-09-03 RX ORDER — PREDNISONE 20 MG/1
20 TABLET ORAL 2 TIMES DAILY
Qty: 6 TABLET | Refills: 0 | Status: SHIPPED | OUTPATIENT
Start: 2017-09-03

## 2017-09-03 RX ORDER — SODIUM CHLORIDE 0.9 % (FLUSH) 0.9 %
10 SYRINGE (ML) INJECTION AS NEEDED
Status: DISCONTINUED | OUTPATIENT
Start: 2017-09-03 | End: 2017-09-03 | Stop reason: HOSPADM

## 2017-09-03 RX ORDER — ONDANSETRON 2 MG/ML
4 INJECTION INTRAMUSCULAR; INTRAVENOUS ONCE
Status: COMPLETED | OUTPATIENT
Start: 2017-09-03 | End: 2017-09-03

## 2017-09-03 RX ORDER — MORPHINE SULFATE 2 MG/ML
2 INJECTION, SOLUTION INTRAMUSCULAR; INTRAVENOUS ONCE
Status: COMPLETED | OUTPATIENT
Start: 2017-09-03 | End: 2017-09-03

## 2017-09-03 RX ADMIN — ONDANSETRON 4 MG: 2 INJECTION INTRAMUSCULAR; INTRAVENOUS at 09:17

## 2017-09-03 RX ADMIN — MORPHINE SULFATE 2 MG: 2 INJECTION, SOLUTION INTRAMUSCULAR; INTRAVENOUS at 09:17

## 2017-09-03 RX ADMIN — TETANUS TOXOID, REDUCED DIPHTHERIA TOXOID AND ACELLULAR PERTUSSIS VACCINE, ADSORBED 0.5 ML: 5; 2.5; 8; 8; 2.5 SUSPENSION INTRAMUSCULAR at 09:53

## 2017-09-03 NOTE — DISCHARGE INSTRUCTIONS
Medications as ordered  Continue current home medications  Use walker when ambulating  Ice and elevate left leg as much as possible  Follow up with pmd, Dr Ramirez, and Dr Gutierrez-call Monday for appointments  Return to er for fever, chills, chest pain, shortness of air, increased pain/swelling or any new or worsening symptoms

## 2017-09-03 NOTE — ED PROVIDER NOTES
The patient with a history of CHF and gout presents complaining of edema in L knee. Pt reports he fell after losing his balance yesterday.  Pt also c/o left thumb pain/tenderness.  Pt denies fever, cough, CP/SOA, abd pain.  Pt has urinary urgency and sometimes loses his urine before he is able to get to the bathroom - an ongoing issue for him.    Limited physical exam:  Patient is nontoxic appearing awake, oriented and alert.  cspine nonTTPalp  Lungs/cardiovascular: good air movement bilaterally, no tachycardia, chest wall nonTTpalp  Abdomen: non-tender  Back/extremities: L knee effusion without warmth or erythema, no laxity of L knee. Pt has good ROM, pulse sensation all 4 ext. L thumb on IP joint has erythema and tenderness.      I supervised care provided by the midlevel provider.  We have discussed this patient's history, physical exam, and treatment plan.  I have reviewed the note and personally saw and examined the patient and agree with the plan of care.    Documentation assistance provided by carrington Sena.  Information recorded by the scribe was done at my direction and has been verified and validated by me.           Perez Sena  09/03/17 6690       Miriam Padilla MD  09/05/17 3908

## 2017-09-03 NOTE — ED PROVIDER NOTES
EMERGENCY DEPARTMENT ENCOUNTER    CHIEF COMPLAINT  Chief Complaint: left leg pain  History given by:patient  History limited by:nothing  Room Number: 06/06  PMD: Juan Hurd MD  Neurosurgery: Dr. Gutierrez     HPI:  Pt is a 77 y.o. male with a h/o back pain who presents with left leg pain for 2 weeks that was acutely worse this morning. The pain is worse in his lower leg. Pt also complains of mild left hip pain. Pt describes 2 falls that occurred 3 days ago secondary to loss of balance. He denies chest pain, dizziness, or any other sx prior to falls. He struck his left knee but did not lose consciousness during these falls. Pt has received epidurals from Dr. Gutierrez in the past. He does not know when his last tetanus shot was. Pt takes 81 mg aspirin QD.   Past Medical History of diabetes, CAD, V-tach, COPD, hypertension, CHF, stoke, CKD, and PAF.    Duration: 2 weeks  Timing:constant  Location:left leg  Intensity/Severity: moderate   Progression: acutely worse this morning   Associated Symptoms: mild left hip pain  Aggravating Factors: none specified  Alleviating Factors:none specified   Previous Episodes: Pt has a h/o back pain  Treatment before arrival: none specified     PAST MEDICAL HISTORY  Active Ambulatory Problems     Diagnosis Date Noted   • Dementia, vascular 03/17/2016   • Parkinson's disease 03/17/2016   • CHF (congestive heart failure) 05/17/2016   • Acute renal failure 05/17/2016   • Chronic kidney disease 05/17/2016   • Leukocytosis 05/17/2016   • Hyperkalemia 05/17/2016   • Ischemic cardiomyopathy 05/17/2016   • Acute on chronic systolic CHF (congestive heart failure) 05/17/2016   • Acute gout 05/23/2016   • Stroke 11/18/2016   • Degeneration of lumbar intervertebral disc 01/30/2017   • Stenosis, spinal, lumbar 01/30/2017   • Diabetes mellitus 02/28/2017   • Gait instability 05/22/2017     Resolved Ambulatory Problems     Diagnosis Date Noted   • Acute on chronic renal insufficiency  02/28/2017   • Dehydration 02/28/2017   • Diarrhea 02/28/2017     Past Medical History:   Diagnosis Date   • Anxiety    • Arthritis    • Atrial tachycardia, paroxysmal    • Brain bleed    • CAD (coronary artery disease)    • CHF (congestive heart failure)    • CKD (chronic kidney disease)    • COPD (chronic obstructive pulmonary disease)    • DDD (degenerative disc disease), lumbar    • Dementia    • Diabetes mellitus    • Gout    • History of positive PPD    • HLD (hyperlipidemia)    • HTN (hypertension)    • Hypothyroid    • Ischemic cardiomyopathy    • Mitral insufficiency    • SASHA (obstructive sleep apnea)    • Osteoarthritis    • PAF (paroxysmal atrial fibrillation)    • Pulmonary HTN    • Spinal stenosis    • Stroke    • V-tach        PAST SURGICAL HISTORY  Past Surgical History:   Procedure Laterality Date   • CHOLECYSTECTOMY     • COLONOSCOPY     • CORONARY ANGIOPLASTY WITH STENT PLACEMENT     • CORONARY ARTERY BYPASS GRAFT     • EYE SURGERY     • LUMBAR EPIDURAL INJECTION  02/16/2017   • MITRAL VALVE ANNULOPLASTY     • SHOULDER SURGERY     • SINUS SURGERY         FAMILY HISTORY  Family History   Problem Relation Age of Onset   • Intracerebral hemorrhage Mother    • Heart disease Father    • Cancer Sister    • Diabetes Other        SOCIAL HISTORY  Social History     Social History   • Marital status:      Spouse name: N/A   • Number of children: N/A   • Years of education: N/A     Occupational History   • Not on file.     Social History Main Topics   • Smoking status: Former Smoker     Packs/day: 1.00   • Smokeless tobacco: Never Used      Comment: PT QUIT 45 YEARS AGO   • Alcohol use No   • Drug use: No      Comment: + CAFFEINE   • Sexual activity: Defer     Other Topics Concern   • Not on file     Social History Narrative    - lives at home with spouse         ALLERGIES  Ambien [zolpidem tartrate]; Hydrocodone-acetaminophen; and Lorazepam    REVIEW OF SYSTEMS  Review of Systems    Constitutional: Negative for chills and fever.   HENT: Negative for sore throat.    Gastrointestinal: Negative for nausea and vomiting.   Genitourinary: Negative for dysuria.   Musculoskeletal: Negative for back pain.        Left leg pain, mild left hip pain   Skin: Negative for rash.   Psychiatric/Behavioral: The patient is not nervous/anxious.        PHYSICAL EXAM  ED Triage Vitals   Temp Heart Rate Resp BP SpO2   09/03/17 0740 09/03/17 0740 09/03/17 0740 09/03/17 0740 09/03/17 0740   97.3 °F (36.3 °C) 102 16 132/88 98 %       Physical Exam   Constitutional: He is well-developed, well-nourished, and in no distress. No distress.   Chronically ill appearing.   HENT:   Head: Head is with abrasion (above left eye).   Mouth/Throat: Mucous membranes are normal.   Eyes: No scleral icterus.   Neck: Normal range of motion.   Cardiovascular: Normal rate, regular rhythm and normal heart sounds.    Pulmonary/Chest: Effort normal and breath sounds normal.   Defibrillator in left upper chest    Musculoskeletal:        Left hip: He exhibits tenderness.        Left knee: He exhibits decreased range of motion (secondary to pain) and swelling. Tenderness found.   Neurological: He is alert.   Skin: Skin is warm and dry. Abrasion (just below left knee) and bruising (multiple, upper extremities) noted.   Psychiatric: Mood and affect normal.   Nursing note and vitals reviewed.      LAB RESULTS  Recent Results (from the past 24 hour(s))   Urinalysis With / Culture If Indicated    Collection Time: 09/03/17  8:10 AM   Result Value Ref Range    Color, UA Yellow Yellow, Straw    Appearance, UA Clear Clear    pH, UA 5.5 5.0 - 8.0    Specific Gravity, UA 1.014 1.005 - 1.030    Glucose, UA Negative Negative    Ketones, UA Negative Negative    Bilirubin, UA Negative Negative    Blood, UA Negative Negative    Protein, UA Negative Negative    Leuk Esterase, UA Trace (A) Negative    Nitrite, UA Negative Negative    Urobilinogen, UA 0.2 E.U./dL  0.2 - 1.0 E.U./dL   Urinalysis, Microscopic Only    Collection Time: 09/03/17  8:10 AM   Result Value Ref Range    RBC, UA 0-2 None Seen, 0-2 /HPF    WBC, UA 0-2 None Seen, 0-2 /HPF    Bacteria, UA None Seen None Seen /HPF    Squamous Epithelial Cells, UA 0-2 None Seen, 0-2 /HPF    Hyaline Casts, UA 0-2 None Seen /LPF    Methodology Automated Microscopy        I ordered the above labs and reviewed the results    RADIOLOGY  XR Knee 1 or 2 View Left   Final Result       No acute fracture identified. Mild to moderate knee effusion. If there   is further clinical concern, MRI could be considered for further   evaluation.       This report was finalized on 9/3/2017 10:09 AM by Dr. Indio Dale MD.          XR Hip With or Without Pelvis 2 - 3 View Left   Final Result       No acute fracture is identified. If there is clinical suspicion for   radiographically occult fracture, or to further evaluate the soft   tissues, MRI could be considered.       This report was finalized on 9/3/2017 10:07 AM by Dr. Indio Dale MD.          CT Head Without Contrast   Final Result           No acute intracranial hemorrhage or hydrocephalus. Chronic changes.   If there is further clinical concern, MRI could be considered for   further evaluation.       This report was finalized on 9/3/2017 9:25 AM by Dr. Indio Dale MD.              I ordered the above noted radiological studies and reviewed the images on the PACS system.        PROGRESS AND CONSULTS    0844: Ordered CT Head without contrast, XR left knee, and XR left hip for further evaluation. Ordered Tdap to prevent tetanus. Pt is wary of narcotics and prefers them only in low doses. I have ordered 2 mg morphine/zofran for pain.     1012: Reviewed pt's history and workup with Dr. Padilla.  At bedside evaluation, they agree with the plan of care.    1015: Rechecked pt. Pt is resting comfortably and pain has improved. Discussed left knee effusion seen on XR but  otherwise unremarkable workup. Pt states he saw Dr. Ramirez once several years ago, so I encouraged him to f/u with him or Dr. Gutierrez as needed. Pt will be discharged with a walker. Pt understands and agrees and all questions were addressed.     Reviewed implications of results, diagnosis, meds, responsibility to follow up, warning signs and symptoms of possible worsening, potential complications and reasons to return to ER with patient.  Discussed all results and noted any abnormalities with patient.  Discussed absolute need to recheck abnormalities with PCP.    Discussed plan for discharge, as there is no emergent indication for admission.  Pt is agreeable and understands need for follow up and repeat testing.  Pt is aware that discharge does not mean that nothing is wrong but it indicates no emergency is present.  Pt is discharged with instructions to follow up with primary care doctor to have their blood pressure rechecked.       DIAGNOSIS  Final diagnoses:   Knee effusion, left   Lower extremity pain, left   Falls, initial encounter       FOLLOW UP   Mateo Ramirez MD  [address] [facility] [address]  [place] [address]  [phone]    Schedule an appointment as soon as possible for a visit  As needed    Jose M Gutierrez MD  [address] [facility] [address]  [place] [address]  [phone]            RX     Medication List      New Prescriptions          predniSONE 20 MG tablet   Commonly known as:  DELTASONE   Take 1 tablet by mouth 2 (Two) Times a Day.           COURSE & MEDICAL DECISION MAKING  Pertinent Labs and Imaging studies that were ordered and reviewed are noted above.  Results were reviewed/discussed with the patient and they were also made aware of online assess.   Pt also made aware that some labs, such as cultures, will not be resulted during ER visit and follow up with PMD is necessary.     MEDICATIONS GIVEN IN ER  Medications   sodium chloride 0.9 % flush 10 mL (not administered)   morphine injection 2 mg  "(2 mg Intravenous Given 9/3/17 0917)   ondansetron (ZOFRAN) injection 4 mg (4 mg Intravenous Given 9/3/17 0917)   Tdap (BOOSTRIX) injection 0.5 mL (0.5 mL Intramuscular Given 9/3/17 0953)       /72  Pulse 59  Temp 97.3 °F (36.3 °C)  Resp 15  Ht 70.5\" (179.1 cm)  Wt 183 lb (83 kg)  SpO2 96%  BMI 25.89 kg/m2      I personally reviewed the past medical history, past surgical history, social history, family history, current medications and allergies as they appear in this chart.  The scribe's note accurately reflects the work and decisions made by me.     Documentation assistance provided by carrington Grace for YOLANDA Hart on 9/3/2017 at 10:19 AM. Information recorded by the scribe was done at my direction and has been verified and validated by me.         Jacki Grace  09/03/17 1049       DARNELL Real  09/03/17 1103    "

## 2017-10-04 ENCOUNTER — TRANSCRIBE ORDERS (OUTPATIENT)
Dept: ADMINISTRATIVE | Facility: HOSPITAL | Age: 77
End: 2017-10-04

## 2017-10-04 ENCOUNTER — LAB (OUTPATIENT)
Dept: LAB | Facility: HOSPITAL | Age: 77
End: 2017-10-04

## 2017-10-04 DIAGNOSIS — E03.9 MYXEDEMA HEART DISEASE: ICD-10-CM

## 2017-10-04 DIAGNOSIS — I11.0 BENIGN HYPERTENSIVE HEART DISEASE WITH CONGESTIVE HEART FAILURE (HCC): Primary | ICD-10-CM

## 2017-10-04 DIAGNOSIS — I51.9 MYXEDEMA HEART DISEASE: ICD-10-CM

## 2017-10-04 DIAGNOSIS — I11.0 BENIGN HYPERTENSIVE HEART DISEASE WITH CONGESTIVE HEART FAILURE (HCC): ICD-10-CM

## 2017-10-04 LAB
ALBUMIN SERPL-MCNC: 3.8 G/DL (ref 3.5–5.2)
ALBUMIN/GLOB SERPL: 1.2 G/DL
ALP SERPL-CCNC: 65 U/L (ref 39–117)
ALT SERPL W P-5'-P-CCNC: 52 U/L (ref 1–41)
ANION GAP SERPL CALCULATED.3IONS-SCNC: 12.1 MMOL/L
AST SERPL-CCNC: 25 U/L (ref 1–40)
BASOPHILS # BLD AUTO: 0.01 10*3/MM3 (ref 0–0.2)
BASOPHILS NFR BLD AUTO: 0.1 % (ref 0–1.5)
BILIRUB SERPL-MCNC: 0.6 MG/DL (ref 0.1–1.2)
BILIRUB UR QL STRIP: NEGATIVE
BUN BLD-MCNC: 59 MG/DL (ref 8–23)
BUN/CREAT SERPL: 32.4 (ref 7–25)
CALCIUM SPEC-SCNC: 9.4 MG/DL (ref 8.6–10.5)
CHLORIDE SERPL-SCNC: 96 MMOL/L (ref 98–107)
CLARITY UR: CLEAR
CO2 SERPL-SCNC: 27.9 MMOL/L (ref 22–29)
COLOR UR: YELLOW
CREAT BLD-MCNC: 1.82 MG/DL (ref 0.76–1.27)
DEPRECATED RDW RBC AUTO: 61.5 FL (ref 37–54)
EOSINOPHIL # BLD AUTO: 0 10*3/MM3 (ref 0–0.7)
EOSINOPHIL NFR BLD AUTO: 0 % (ref 0.3–6.2)
ERYTHROCYTE [DISTWIDTH] IN BLOOD BY AUTOMATED COUNT: 17.1 % (ref 11.5–14.5)
GFR SERPL CREATININE-BSD FRML MDRD: 36 ML/MIN/1.73
GLOBULIN UR ELPH-MCNC: 3.1 GM/DL
GLUCOSE BLD-MCNC: 456 MG/DL (ref 65–99)
GLUCOSE UR STRIP-MCNC: ABNORMAL MG/DL
HCT VFR BLD AUTO: 50 % (ref 40.4–52.2)
HGB BLD-MCNC: 15.9 G/DL (ref 13.7–17.6)
HGB UR QL STRIP.AUTO: NEGATIVE
IMM GRANULOCYTES # BLD: 0.15 10*3/MM3 (ref 0–0.03)
IMM GRANULOCYTES NFR BLD: 1.2 % (ref 0–0.5)
KETONES UR QL STRIP: NEGATIVE
LEUKOCYTE ESTERASE UR QL STRIP.AUTO: NEGATIVE
LYMPHOCYTES # BLD AUTO: 0.36 10*3/MM3 (ref 0.9–4.8)
LYMPHOCYTES NFR BLD AUTO: 2.9 % (ref 19.6–45.3)
MCH RBC QN AUTO: 31.4 PG (ref 27–32.7)
MCHC RBC AUTO-ENTMCNC: 31.8 G/DL (ref 32.6–36.4)
MCV RBC AUTO: 98.6 FL (ref 79.8–96.2)
MONOCYTES # BLD AUTO: 0.23 10*3/MM3 (ref 0.2–1.2)
MONOCYTES NFR BLD AUTO: 1.9 % (ref 5–12)
NEUTROPHILS # BLD AUTO: 11.56 10*3/MM3 (ref 1.9–8.1)
NEUTROPHILS NFR BLD AUTO: 93.9 % (ref 42.7–76)
NITRITE UR QL STRIP: NEGATIVE
PH UR STRIP.AUTO: 6 [PH] (ref 5–8)
PLATELET # BLD AUTO: 157 10*3/MM3 (ref 140–500)
PMV BLD AUTO: 10.6 FL (ref 6–12)
POTASSIUM BLD-SCNC: 5.2 MMOL/L (ref 3.5–5.2)
PROT SERPL-MCNC: 6.9 G/DL (ref 6–8.5)
PROT UR QL STRIP: NEGATIVE
RBC # BLD AUTO: 5.07 10*6/MM3 (ref 4.6–6)
SODIUM BLD-SCNC: 136 MMOL/L (ref 136–145)
SP GR UR STRIP: 1.01 (ref 1–1.03)
TSH SERPL DL<=0.05 MIU/L-ACNC: 1.12 MIU/ML (ref 0.27–4.2)
UROBILINOGEN UR QL STRIP: ABNORMAL
WBC NRBC COR # BLD: 12.31 10*3/MM3 (ref 4.5–10.7)

## 2017-10-04 PROCEDURE — 85025 COMPLETE CBC W/AUTO DIFF WBC: CPT | Performed by: INTERNAL MEDICINE

## 2017-10-04 PROCEDURE — 84443 ASSAY THYROID STIM HORMONE: CPT | Performed by: INTERNAL MEDICINE

## 2017-10-04 PROCEDURE — 36415 COLL VENOUS BLD VENIPUNCTURE: CPT

## 2017-10-04 PROCEDURE — 81003 URINALYSIS AUTO W/O SCOPE: CPT | Performed by: INTERNAL MEDICINE

## 2017-10-04 PROCEDURE — 80053 COMPREHEN METABOLIC PANEL: CPT | Performed by: INTERNAL MEDICINE

## 2017-12-18 ENCOUNTER — OFFICE VISIT (OUTPATIENT)
Dept: NEUROSURGERY | Facility: CLINIC | Age: 77
End: 2017-12-18

## 2017-12-18 VITALS
HEIGHT: 71 IN | HEART RATE: 90 BPM | DIASTOLIC BLOOD PRESSURE: 74 MMHG | WEIGHT: 183 LBS | BODY MASS INDEX: 25.62 KG/M2 | SYSTOLIC BLOOD PRESSURE: 121 MMHG

## 2017-12-18 DIAGNOSIS — M51.36 DEGENERATION OF LUMBAR INTERVERTEBRAL DISC: ICD-10-CM

## 2017-12-18 DIAGNOSIS — M48.062 SPINAL STENOSIS OF LUMBAR REGION WITH NEUROGENIC CLAUDICATION: Primary | ICD-10-CM

## 2017-12-18 DIAGNOSIS — M43.16 SPONDYLOLISTHESIS AT L4-L5 LEVEL: ICD-10-CM

## 2017-12-18 PROCEDURE — 99214 OFFICE O/P EST MOD 30 MIN: CPT | Performed by: NEUROLOGICAL SURGERY

## 2017-12-18 NOTE — PROGRESS NOTES
Subjective   Patient ID: Eugenio Vargas is a 77 y.o. male is here today for follow-up on back pain.    At the patient's last visit reported new onset of right sided low back pain. His symptoms increased with prolonged sitting. He also reported not getting a lot of relief from the epidurals. He was started on Gabapentin 100 mg BID at the last visit.    Today the patient reports that he has only been taking one Gabapentin at night. He reports that his back pain has gotten worse since the last visit. It now radiates into bilateral legs down to the ankle. He states that the right leg is worse than the left.    Back Pain   This is a chronic problem. The current episode started more than 1 month ago. The problem occurs daily. The problem has been gradually worsening since onset. The pain is present in the lumbar spine. Associated symptoms include bladder incontinence, leg pain and weakness. Pertinent negatives include no bowel incontinence, numbness or tingling.       The following portions of the patient's history were reviewed and updated as appropriate: allergies, current medications, past family history, past medical history, past social history, past surgical history and problem list.    Review of Systems   Gastrointestinal: Negative for bowel incontinence.   Genitourinary: Positive for bladder incontinence.   Musculoskeletal: Positive for back pain.   Neurological: Positive for weakness. Negative for tingling and numbness.   All other systems reviewed and are negative.    It has been about 8 months since I have seen him.  He is with his wife.  He has known spinal stenosis at L4-L5 and a listhesis at L4-L5.  He has a pacemaker and defibrillator and cannot have an MRI.  The last block he had was in May of this year and it lasted for 2 months.  He feels that he has been getting worse and now both legs are involved.  He does not have any motor deficits.  He is on aspirin.  The block lasted about 2 months.  I think we  should try at least 2 to see if we can get some sustained relief, but if we do not, then I think we need to switch gears and consider surgery starting, but doing a myelogram.  Dr. Prasanth Faustin is his Cardiologist and he will be seeing him shortly.           Objective   Physical Exam   Constitutional: He is oriented to person, place, and time. He appears well-developed and well-nourished.   HENT:   Head: Normocephalic and atraumatic.   Eyes: Conjunctivae and EOM are normal. Pupils are equal, round, and reactive to light.   Fundoscopic exam:       The right eye shows no papilledema. The right eye shows venous pulsations.        The left eye shows no papilledema. The left eye shows venous pulsations.   Neck: Carotid bruit is not present.   Neurological: He is oriented to person, place, and time. He has a normal Finger-Nose-Finger Test and a normal Heel to Shin Test. Gait normal.   Reflex Scores:       Tricep reflexes are 2+ on the right side and 2+ on the left side.       Bicep reflexes are 2+ on the right side and 2+ on the left side.       Brachioradialis reflexes are 2+ on the right side and 2+ on the left side.       Patellar reflexes are 2+ on the right side and 2+ on the left side.       Achilles reflexes are 2+ on the right side and 2+ on the left side.  Psychiatric: His speech is normal.     Neurologic Exam     Mental Status   Oriented to person, place, and time.   Registration of memory: Good recent and remote memory.   Attention: normal. Concentration: normal.   Speech: speech is normal   Level of consciousness: alert  Knowledge: consistent with education.     Cranial Nerves     CN II   Visual fields full to confrontation.   Visual acuity: normal    CN III, IV, VI   Pupils are equal, round, and reactive to light.  Extraocular motions are normal.     CN V   Facial sensation intact.   Right corneal reflex: normal  Left corneal reflex: normal    CN VII   Facial expression full, symmetric.   Right facial  weakness: none  Left facial weakness: none    CN VIII   Hearing: intact    CN IX, X   Palate: symmetric    CN XI   Right sternocleidomastoid strength: normal  Left sternocleidomastoid strength: normal    CN XII   Tongue: not atrophic  Tongue deviation: none    Motor Exam   Muscle bulk: normal  Right arm tone: normal  Left arm tone: normal  Right leg tone: normal  Left leg tone: normal    Strength   Strength 5/5 except as noted.     Sensory Exam   Light touch normal.     Gait, Coordination, and Reflexes     Gait  Gait: normal    Coordination   Finger to nose coordination: normal  Heel to shin coordination: normal    Reflexes   Right brachioradialis: 2+  Left brachioradialis: 2+  Right biceps: 2+  Left biceps: 2+  Right triceps: 2+  Left triceps: 2+  Right patellar: 2+  Left patellar: 2+  Right achilles: 2+  Left achilles: 2+  Right : 2+  Left : 2+      Assessment/Plan   Independent Review of Radiographic Studies:    The CT scan done on 02/24/12 shows spinal stenosis at L4-L5 with listhesis at L4-L5.  I agree with the report.        Medical Decision Making:    We may be getting close to the time where we need to abandon nonsurgical treatment, but I think it is worthwhile to try blocks again.  He will get 2 blocks from the pain clinic and come back to see me in 2 months.  If he is not any better, then we should get a myelogram, cardiac clearance and consider decompression and fusion probably at L4-L5.        Eugenio was seen today for back pain.    Diagnoses and all orders for this visit:    Spinal stenosis of lumbar region with neurogenic claudication  -     Epidural Block    Degeneration of lumbar intervertebral disc  -     Epidural Block    Spondylolisthesis at L4-L5 level  -     Epidural Block    Return in about 2 months (around 2/18/2018).

## 2017-12-21 ENCOUNTER — APPOINTMENT (OUTPATIENT)
Dept: GENERAL RADIOLOGY | Facility: HOSPITAL | Age: 77
End: 2017-12-21

## 2017-12-21 ENCOUNTER — HOSPITAL ENCOUNTER (EMERGENCY)
Facility: HOSPITAL | Age: 77
Discharge: HOME | End: 2017-12-21
Attending: EMERGENCY MEDICINE | Admitting: EMERGENCY MEDICINE

## 2017-12-21 VITALS
DIASTOLIC BLOOD PRESSURE: 76 MMHG | RESPIRATION RATE: 20 BRPM | OXYGEN SATURATION: 98 % | WEIGHT: 194 LBS | HEIGHT: 70 IN | BODY MASS INDEX: 27.77 KG/M2 | HEART RATE: 59 BPM | SYSTOLIC BLOOD PRESSURE: 124 MMHG | TEMPERATURE: 97.6 F

## 2017-12-21 DIAGNOSIS — I50.23 ACUTE ON CHRONIC SYSTOLIC CHF (CONGESTIVE HEART FAILURE) (HCC): Primary | ICD-10-CM

## 2017-12-21 LAB
ALBUMIN SERPL-MCNC: 4 G/DL (ref 3.5–5.2)
ALBUMIN/GLOB SERPL: 1.3 G/DL
ALP SERPL-CCNC: 60 U/L (ref 39–117)
ALT SERPL W P-5'-P-CCNC: 46 U/L (ref 1–41)
ANION GAP SERPL CALCULATED.3IONS-SCNC: 12.3 MMOL/L
AST SERPL-CCNC: 37 U/L (ref 1–40)
BASOPHILS # BLD AUTO: 0.03 10*3/MM3 (ref 0–0.2)
BASOPHILS NFR BLD AUTO: 0.2 % (ref 0–1.5)
BILIRUB SERPL-MCNC: 0.7 MG/DL (ref 0.1–1.2)
BILIRUB UR QL STRIP: NEGATIVE
BUN BLD-MCNC: 80 MG/DL (ref 8–23)
BUN/CREAT SERPL: 39.8 (ref 7–25)
CALCIUM SPEC-SCNC: 9.1 MG/DL (ref 8.6–10.5)
CHLORIDE SERPL-SCNC: 97 MMOL/L (ref 98–107)
CLARITY UR: CLEAR
CO2 SERPL-SCNC: 26.7 MMOL/L (ref 22–29)
COLOR UR: YELLOW
CREAT BLD-MCNC: 2.01 MG/DL (ref 0.76–1.27)
DEPRECATED RDW RBC AUTO: 57.4 FL (ref 37–54)
EOSINOPHIL # BLD AUTO: 0.16 10*3/MM3 (ref 0–0.7)
EOSINOPHIL NFR BLD AUTO: 1.1 % (ref 0.3–6.2)
ERYTHROCYTE [DISTWIDTH] IN BLOOD BY AUTOMATED COUNT: 15.8 % (ref 11.5–14.5)
GFR SERPL CREATININE-BSD FRML MDRD: 32 ML/MIN/1.73
GLOBULIN UR ELPH-MCNC: 3.1 GM/DL
GLUCOSE BLD-MCNC: 116 MG/DL (ref 65–99)
GLUCOSE UR STRIP-MCNC: NEGATIVE MG/DL
HCT VFR BLD AUTO: 47.6 % (ref 40.4–52.2)
HGB BLD-MCNC: 15.5 G/DL (ref 13.7–17.6)
HGB UR QL STRIP.AUTO: NEGATIVE
HOLD SPECIMEN: NORMAL
HOLD SPECIMEN: NORMAL
IMM GRANULOCYTES # BLD: 0.61 10*3/MM3 (ref 0–0.03)
IMM GRANULOCYTES NFR BLD: 4.3 % (ref 0–0.5)
KETONES UR QL STRIP: NEGATIVE
LEUKOCYTE ESTERASE UR QL STRIP.AUTO: NEGATIVE
LYMPHOCYTES # BLD AUTO: 1.22 10*3/MM3 (ref 0.9–4.8)
LYMPHOCYTES NFR BLD AUTO: 8.6 % (ref 19.6–45.3)
MCH RBC QN AUTO: 32.4 PG (ref 27–32.7)
MCHC RBC AUTO-ENTMCNC: 32.6 G/DL (ref 32.6–36.4)
MCV RBC AUTO: 99.6 FL (ref 79.8–96.2)
MONOCYTES # BLD AUTO: 1.38 10*3/MM3 (ref 0.2–1.2)
MONOCYTES NFR BLD AUTO: 9.7 % (ref 5–12)
NEUTROPHILS # BLD AUTO: 10.8 10*3/MM3 (ref 1.9–8.1)
NEUTROPHILS NFR BLD AUTO: 76.1 % (ref 42.7–76)
NITRITE UR QL STRIP: NEGATIVE
NRBC BLD MANUAL-RTO: 0.3 /100 WBC (ref 0–0)
NT-PROBNP SERPL-MCNC: 8171 PG/ML (ref 0–1800)
PH UR STRIP.AUTO: 5.5 [PH] (ref 5–8)
PLATELET # BLD AUTO: 220 10*3/MM3 (ref 140–500)
PMV BLD AUTO: 10.3 FL (ref 6–12)
POTASSIUM BLD-SCNC: 4.3 MMOL/L (ref 3.5–5.2)
PROT SERPL-MCNC: 7.1 G/DL (ref 6–8.5)
PROT UR QL STRIP: NEGATIVE
RBC # BLD AUTO: 4.78 10*6/MM3 (ref 4.6–6)
SODIUM BLD-SCNC: 136 MMOL/L (ref 136–145)
SP GR UR STRIP: 1.01 (ref 1–1.03)
TROPONIN T SERPL-MCNC: 0.03 NG/ML (ref 0–0.03)
UROBILINOGEN UR QL STRIP: NORMAL
WBC NRBC COR # BLD: 14.2 10*3/MM3 (ref 4.5–10.7)
WHOLE BLOOD HOLD SPECIMEN: NORMAL
WHOLE BLOOD HOLD SPECIMEN: NORMAL

## 2017-12-21 PROCEDURE — 36415 COLL VENOUS BLD VENIPUNCTURE: CPT | Performed by: EMERGENCY MEDICINE

## 2017-12-21 PROCEDURE — 51798 US URINE CAPACITY MEASURE: CPT

## 2017-12-21 PROCEDURE — 71020 HC CHEST PA AND LATERAL: CPT

## 2017-12-21 PROCEDURE — 99284 EMERGENCY DEPT VISIT MOD MDM: CPT

## 2017-12-21 PROCEDURE — 25010000002 FUROSEMIDE PER 20 MG: Performed by: EMERGENCY MEDICINE

## 2017-12-21 PROCEDURE — 96374 THER/PROPH/DIAG INJ IV PUSH: CPT

## 2017-12-21 PROCEDURE — 84484 ASSAY OF TROPONIN QUANT: CPT | Performed by: EMERGENCY MEDICINE

## 2017-12-21 PROCEDURE — 80053 COMPREHEN METABOLIC PANEL: CPT | Performed by: EMERGENCY MEDICINE

## 2017-12-21 PROCEDURE — 81003 URINALYSIS AUTO W/O SCOPE: CPT | Performed by: EMERGENCY MEDICINE

## 2017-12-21 PROCEDURE — 85025 COMPLETE CBC W/AUTO DIFF WBC: CPT | Performed by: EMERGENCY MEDICINE

## 2017-12-21 PROCEDURE — 93005 ELECTROCARDIOGRAM TRACING: CPT

## 2017-12-21 PROCEDURE — 83880 ASSAY OF NATRIURETIC PEPTIDE: CPT | Performed by: EMERGENCY MEDICINE

## 2017-12-21 PROCEDURE — 93010 ELECTROCARDIOGRAM REPORT: CPT | Performed by: INTERNAL MEDICINE

## 2017-12-21 RX ORDER — SODIUM CHLORIDE 0.9 % (FLUSH) 0.9 %
10 SYRINGE (ML) INJECTION AS NEEDED
Status: DISCONTINUED | OUTPATIENT
Start: 2017-12-21 | End: 2017-12-21 | Stop reason: HOSPADM

## 2017-12-21 RX ORDER — FUROSEMIDE 10 MG/ML
60 INJECTION INTRAMUSCULAR; INTRAVENOUS ONCE
Status: COMPLETED | OUTPATIENT
Start: 2017-12-21 | End: 2017-12-21

## 2017-12-21 RX ADMIN — FUROSEMIDE 60 MG: 10 INJECTION, SOLUTION INTRAMUSCULAR; INTRAVENOUS at 17:37

## 2017-12-21 NOTE — ED PROVIDER NOTES
EMERGENCY DEPARTMENT ENCOUNTER    CHIEF COMPLAINT  Chief Complaint: SOB  History given by: Patient and wife  History limited by: Nothing  Room Number: 12/12  PMD: Juan Hurd MD      HPI:  Pt is a 77 y.o. male who presents with is wife complaining of increasing exertional SOB that began 2-3 days ago.  Wife reports associated leg and abdominal swelling that also began 2-3 days ago.  Wife also reports that Pt has had strong foul smelling urine for the past 3 days stopped urinating completely today.  Pt denies, chest pain, coughing, fever, sinus congestion or drainage. Wife states that Pt does not take his water pills consistently.        Duration:  3 days  Onset: Gradual  Timing: Constant  Intensity/Severity: Moderaer  Progression: Unchanged  Associated Symptoms: Wife reports associated leg and abdominal swelling that began 2-3 days ago.  Wife also reports that Pt has had strong foul smelling urine for the past 3 days stopped urinating completely today.   Aggravating Factors: None  Alleviating Factors: None  Previous Episodes: Unknown   Treatment before arrival: None    PAST MEDICAL HISTORY  Active Ambulatory Problems     Diagnosis Date Noted   • Dementia, vascular 03/17/2016   • Parkinson's disease 03/17/2016   • CHF (congestive heart failure) 05/17/2016   • Acute renal failure 05/17/2016   • Chronic kidney disease 05/17/2016   • Leukocytosis 05/17/2016   • Hyperkalemia 05/17/2016   • Ischemic cardiomyopathy 05/17/2016   • Acute on chronic systolic CHF (congestive heart failure) 05/17/2016   • Acute gout 05/23/2016   • Stroke 11/18/2016   • Degeneration of lumbar intervertebral disc 01/30/2017   • Stenosis, spinal, lumbar 01/30/2017   • Diabetes mellitus 02/28/2017   • Gait instability 05/22/2017   • Spondylolisthesis at L4-L5 level 12/18/2017     Resolved Ambulatory Problems     Diagnosis Date Noted   • Acute on chronic renal insufficiency 02/28/2017   • Dehydration 02/28/2017   • Diarrhea 02/28/2017     Past  Medical History:   Diagnosis Date   • Anxiety    • Arthritis    • Atrial tachycardia, paroxysmal    • Brain bleed    • CAD (coronary artery disease)    • CHF (congestive heart failure)    • CKD (chronic kidney disease)    • COPD (chronic obstructive pulmonary disease)    • DDD (degenerative disc disease), lumbar    • Dementia    • Diabetes mellitus    • Gout    • History of positive PPD    • HLD (hyperlipidemia)    • HTN (hypertension)    • Hypothyroid    • Ischemic cardiomyopathy    • Mitral insufficiency    • SASHA (obstructive sleep apnea)    • Osteoarthritis    • PAF (paroxysmal atrial fibrillation)    • Pulmonary HTN    • Spinal stenosis    • Stroke    • V-tach        PAST SURGICAL HISTORY  Past Surgical History:   Procedure Laterality Date   • CHOLECYSTECTOMY     • COLONOSCOPY     • CORONARY ANGIOPLASTY WITH STENT PLACEMENT     • CORONARY ARTERY BYPASS GRAFT     • EYE SURGERY     • LUMBAR EPIDURAL INJECTION  02/16/2017   • MITRAL VALVE ANNULOPLASTY     • SHOULDER SURGERY     • SINUS SURGERY         FAMILY HISTORY  Family History   Problem Relation Age of Onset   • Intracerebral hemorrhage Mother    • Heart disease Father    • Cancer Sister    • Diabetes Other        SOCIAL HISTORY  Social History     Social History   • Marital status:      Spouse name: N/A   • Number of children: N/A   • Years of education: N/A     Occupational History   • Not on file.     Social History Main Topics   • Smoking status: Former Smoker     Packs/day: 1.00   • Smokeless tobacco: Never Used      Comment: PT QUIT 45 YEARS AGO   • Alcohol use No   • Drug use: No      Comment: + CAFFEINE   • Sexual activity: Defer     Other Topics Concern   • Not on file     Social History Narrative    - lives at home with spouse       ALLERGIES  Ambien [zolpidem tartrate]; Hydrocodone-acetaminophen; and Lorazepam    REVIEW OF SYSTEMS  Review of Systems    PHYSICAL EXAM  ED Triage Vitals   Temp Heart Rate Resp BP SpO2   12/21/17 1510  12/21/17 1510 12/21/17 1510 12/21/17 1508 12/21/17 1510   97.6 °F (36.4 °C) 77 18 155/91 94 %      Temp src Heart Rate Source Patient Position BP Location FiO2 (%)   12/21/17 1510 -- -- -- --   Oral           Physical Exam   Constitutional: He is oriented to person, place, and time and well-developed, well-nourished, and in no distress.   HENT:   Head: Normocephalic and atraumatic.   Eyes: EOM are normal. Pupils are equal, round, and reactive to light.   Neck: Normal range of motion. Neck supple. No JVD present.   No lymphadenopathy    Cardiovascular: Normal rate, regular rhythm and normal heart sounds.    Pulmonary/Chest: Effort normal. No respiratory distress. He has rales in the right lower field and the left lower field.   Abdominal: Soft. He exhibits no distension. There is no tenderness. There is no rebound and no guarding.   Musculoskeletal: Normal range of motion. He exhibits no edema.   Neurological: He is alert and oriented to person, place, and time. He has normal sensation and normal strength.   Skin: Skin is warm and dry.   Psychiatric: Mood and affect normal.   Nursing note and vitals reviewed.      LAB RESULTS  Lab Results (last 24 hours)     Procedure Component Value Units Date/Time    CBC & Differential [[id number]] Collected:  12/21/17 1530    Specimen:  Blood Updated:  12/21/17 1549    Narrative:       The following orders were created for panel order CBC & Differential.  Procedure                               Abnormality         Status                     ---------                               -----------         ------                     Scan Slide[[id number]]                                                                  CBC Auto Differential[[id number]]        Abnormal            Final result                 Please view results for these tests on the individual orders.    Comprehensive Metabolic Panel [[id number]]  (Abnormal) Collected:  12/21/17 1530    Specimen:  Blood Updated:  12/21/17  1609     Glucose 116 (H) mg/dL      BUN 80 (H) mg/dL      Creatinine 2.01 (H) mg/dL      Sodium 136 mmol/L      Potassium 4.3 mmol/L      Chloride 97 (L) mmol/L      CO2 26.7 mmol/L      Calcium 9.1 mg/dL      Total Protein 7.1 g/dL      Albumin 4.00 g/dL      ALT (SGPT) 46 (H) U/L      AST (SGOT) 37 U/L      Alkaline Phosphatase 60 U/L      Total Bilirubin 0.7 mg/dL      eGFR Non African Amer 32 (L) mL/min/1.73      Globulin 3.1 gm/dL      A/G Ratio 1.3 g/dL      BUN/Creatinine Ratio 39.8 (H)     Anion Gap 12.3 mmol/L     Narrative:       The MDRD GFR formula is only valid for adults with stable renal function between ages 18 and 70.    BNP [[id number]]  (Abnormal) Collected:  12/21/17 1530    Specimen:  Blood Updated:  12/21/17 1615     proBNP 8171.0 (H) pg/mL     Narrative:       Among patients with dyspnea, NT-proBNP is highly sensitive for the detection of acute congestive heart failure. In addition NT-proBNP of <300 pg/ml effectively rules out acute congestive heart failure with 99% negative predictive value.    Troponin [[id number]]  (Normal) Collected:  12/21/17 1530    Specimen:  Blood Updated:  12/21/17 1615     Troponin T 0.026 ng/mL     Narrative:       Troponin T Reference Ranges:  Less than 0.03 ng/mL:    Negative for AMI  0.03 to 0.09 ng/mL:      Indeterminant for AMI  Greater than 0.09 ng/mL: Positive for AMI    CBC Auto Differential [[id number]]  (Abnormal) Collected:  12/21/17 1530    Specimen:  Blood Updated:  12/21/17 1549     WBC 14.20 (H) 10*3/mm3      RBC 4.78 10*6/mm3      Hemoglobin 15.5 g/dL      Hematocrit 47.6 %      MCV 99.6 (H) fL      MCH 32.4 pg      MCHC 32.6 g/dL      RDW 15.8 (H) %      RDW-SD 57.4 (H) fl      MPV 10.3 fL      Platelets 220 10*3/mm3      Neutrophil % 76.1 (H) %      Lymphocyte % 8.6 (L) %      Monocyte % 9.7 %      Eosinophil % 1.1 %      Basophil % 0.2 %      Immature Grans % 4.3 (H) %      Neutrophils, Absolute 10.80 (H) 10*3/mm3      Lymphocytes, Absolute 1.22  10*3/mm3      Monocytes, Absolute 1.38 (H) 10*3/mm3      Eosinophils, Absolute 0.16 10*3/mm3      Basophils, Absolute 0.03 10*3/mm3      Immature Grans, Absolute 0.61 (H) 10*3/mm3      nRBC 0.3 (H) /100 WBC     Urinalysis With / Culture If Indicated - Urine, Clean Catch [[id number]]  (Normal) Collected:  12/21/17 1831    Specimen:  Urine from Urine, Clean Catch Updated:  12/21/17 1846     Color, UA Yellow     Appearance, UA Clear     pH, UA 5.5     Specific Gravity, UA 1.009     Glucose, UA Negative     Ketones, UA Negative     Bilirubin, UA Negative     Blood, UA Negative     Protein, UA Negative     Leuk Esterase, UA Negative     Nitrite, UA Negative     Urobilinogen, UA 0.2 E.U./dL    Narrative:       Urine microscopic not indicated.          I ordered the above labs and reviewed the results    RADIOLOGY  XR Chest 2 View   Preliminary Result   Cardiomegaly with pulmonary vascular engorgement but no   edema.               I ordered the above noted radiological studies. Interpreted by radiologist. Reviewed by me in PACS.       PROCEDURES  Procedures     EKG           EKG time: 1627  Rhythm/Rate: AV paced rhythm, rate 63      Interpreted Contemporaneously by me, independently viewed  unchanged compared to prior 5/17/16      PROGRESS AND CONSULTS  ED Course       1900  Pt has urinated twice, family has been updated.  Notified that he will be able to go home.  Instructed that he should return if he gains 2 pounds within 24 hours with increasing SOB.      MEDICAL DECISION MAKING  Results were reviewed/discussed with the patient and they were also made aware of online access. Pt also made aware that some labs, such as cultures, will not be resulted during ER visit and follow up with PMD is necessary.     MDM  Number of Diagnoses or Management Options  Acute on chronic systolic CHF (congestive heart failure):      Amount and/or Complexity of Data Reviewed  Clinical lab tests: ordered and reviewed (Labs show nothing  acute)  Tests in the radiology section of CPT®: ordered and reviewed (Cardiomegaly with pulmonary vascular engorgement but no edema.)  Tests in the medicine section of CPT®: ordered and reviewed (See EKG)  Decide to obtain previous medical records or to obtain history from someone other than the patient: yes    Patient Progress  Patient progress: stable         DIAGNOSIS  Final diagnoses:   Acute on chronic systolic CHF (congestive heart failure)       DISPOSITION  DISCHARGE    Patient discharged in stable condition.    Reviewed implications of results, diagnosis, meds, responsibility to follow up, warning signs and symptoms of possible worsening, potential complications and reasons to return to ER.    Patient/Family voiced understanding of above instructions.    Discussed plan for discharge, as there is no emergent indication for admission.  Pt/family is agreeable and understands need for follow up and repeat testing.  Pt is aware that discharge does not mean that nothing is wrong but it indicates no emergency is present that requires admission and they must continue care with follow-up as given below or physician of their choice.     FOLLOW-UP  Juan Hurd MD  [id number] [address]  [phone]    Schedule an appointment as soon as possible for a visit           Medication List      Notice     No changes were made to your prescriptions during this visit.            Latest Documented Vital Signs:  As of 11:55 PM  BP- 124/76 HR- 59 Temp- 97.6 °F (36.4 °C) (Oral) O2 sat- 98%    --  Documentation assistance provided by carrington Dumont for Dr. Agrawal.  Information recorded by the scribe was done at my direction and has been verified and validated by me.          Ellen Dumont  12/21/17 2219       Misael Agrawal MD  12/21/17 2469

## 2017-12-21 NOTE — ED NOTES
Pt reports increased SOB over the past three days, worsening last night. Pt also reports swelling in BLE and abdomen.      Sangita Sood RN  12/21/17 3086

## 2017-12-22 NOTE — DISCHARGE INSTRUCTIONS
Increase your demadex to 80mg (4 pills) twice a day for two days and then decrease to 3 pills twice a day.  Please return to the ED if symptoms worsen with increasing shortness of breath or weight gain of greater than 2 pounds a day.

## 2018-01-11 ENCOUNTER — ANESTHESIA EVENT (OUTPATIENT)
Dept: PAIN MEDICINE | Facility: HOSPITAL | Age: 78
End: 2018-01-11

## 2018-01-11 ENCOUNTER — ANESTHESIA (OUTPATIENT)
Dept: PAIN MEDICINE | Facility: HOSPITAL | Age: 78
End: 2018-01-11

## 2018-01-11 ENCOUNTER — HOSPITAL ENCOUNTER (OUTPATIENT)
Dept: PAIN MEDICINE | Facility: HOSPITAL | Age: 78
Discharge: HOME | End: 2018-01-11
Attending: NEUROLOGICAL SURGERY | Admitting: NEUROLOGICAL SURGERY

## 2018-01-11 ENCOUNTER — HOSPITAL ENCOUNTER (OUTPATIENT)
Dept: GENERAL RADIOLOGY | Facility: HOSPITAL | Age: 78
Discharge: HOME | End: 2018-01-11

## 2018-01-11 VITALS
TEMPERATURE: 97.6 F | HEART RATE: 60 BPM | OXYGEN SATURATION: 100 % | RESPIRATION RATE: 16 BRPM | DIASTOLIC BLOOD PRESSURE: 70 MMHG | SYSTOLIC BLOOD PRESSURE: 118 MMHG

## 2018-01-11 DIAGNOSIS — R52 PAIN: ICD-10-CM

## 2018-01-11 PROCEDURE — 77003 FLUOROGUIDE FOR SPINE INJECT: CPT

## 2018-01-11 PROCEDURE — C1755 CATHETER, INTRASPINAL: HCPCS

## 2018-01-11 PROCEDURE — 25010000002 METHYLPREDNISOLONE PER 80 MG: Performed by: ANESTHESIOLOGY

## 2018-01-11 RX ORDER — LIDOCAINE HYDROCHLORIDE 10 MG/ML
1 INJECTION, SOLUTION INFILTRATION; PERINEURAL ONCE AS NEEDED
Status: DISCONTINUED | OUTPATIENT
Start: 2018-01-11 | End: 2018-01-12 | Stop reason: HOSPADM

## 2018-01-11 RX ORDER — METHYLPREDNISOLONE ACETATE 80 MG/ML
80 INJECTION, SUSPENSION INTRA-ARTICULAR; INTRALESIONAL; INTRAMUSCULAR; SOFT TISSUE ONCE
Status: COMPLETED | OUTPATIENT
Start: 2018-01-11 | End: 2018-01-11

## 2018-01-11 RX ADMIN — METHYLPREDNISOLONE ACETATE 80 MG: 80 INJECTION, SUSPENSION INTRA-ARTICULAR; INTRALESIONAL; INTRAMUSCULAR; SOFT TISSUE at 08:36

## 2018-01-11 NOTE — H&P
[facility]    History and Physical    Patient Name: Eugenio Vargas  :  1940  MRN:  [id number]  Date of Admission: 2018    Subjective     Patient is a 77 y.o. male presents with chief complaint of chronic low back pain.  Onset of symptoms was gradual starting several years ago.  Symptoms are associated/aggravated by activity. Symptoms improve with injection, last epidural 10 months ago, helped >50%.  Pain worse on the left, radiate to both feet.    The following portions of the patients history were reviewed and updated as appropriate: current medications, allergies, past medical history, past surgical history, past family history, past social history and problem list                Objective     Past Medical History:   Past Medical History:   Diagnosis Date   • Anxiety    • Arthritis    • Atrial tachycardia, paroxysmal    • Brain bleed     DEFBRILLATOR FIRED AND PT FELL   • CAD (coronary artery disease)    • CHF (congestive heart failure)    • CKD (chronic kidney disease)    • COPD (chronic obstructive pulmonary disease)    • DDD (degenerative disc disease), lumbar    • Dementia    • Diabetes mellitus    • Gout    • History of positive PPD     pt states positive reactor, but never diagnosed/treated for TB   • HLD (hyperlipidemia)    • HTN (hypertension)    • Hypothyroid    • Ischemic cardiomyopathy    • Mitral insufficiency    • SASHA (obstructive sleep apnea)    • Osteoarthritis    • PAF (paroxysmal atrial fibrillation)    • Pulmonary HTN    • Spinal stenosis    • Stroke     STROKE OCCURED DURING CARDIAC SURGERY IN ; MEMORY LOSS   • V-tach     Paroxysmal     Past Surgical History:   Past Surgical History:   Procedure Laterality Date   • CHOLECYSTECTOMY     • COLONOSCOPY     • CORONARY ANGIOPLASTY WITH STENT PLACEMENT     • CORONARY ARTERY BYPASS GRAFT     • EYE SURGERY     • LUMBAR EPIDURAL INJECTION  2017   • MITRAL VALVE ANNULOPLASTY     • SHOULDER SURGERY     • SINUS SURGERY        Family History:   Family History   Problem Relation Age of Onset   • Intracerebral hemorrhage Mother    • Heart disease Father    • Cancer Sister    • Diabetes Other      Social History:   Social History   Substance Use Topics   • Smoking status: Former Smoker     Packs/day: 1.00   • Smokeless tobacco: Never Used      Comment: PT QUIT 45 YEARS AGO   • Alcohol use No       Vital Signs Range for the last 24 hours  Temperature: Temp:  [36.4 °C (97.6 °F)] 36.4 °C (97.6 °F)   Temp Source: Temp src: Oral   BP: BP: (133)/(89) 133/89   Pulse: Heart Rate:  [79] 79   Respirations: Resp:  [16] 16   SPO2: SpO2:  [96 %] 96 %   O2 Amount (l/min):     O2 Devices O2 Device: room air   Weight:           --------------------------------------------------------------------------------    Current Outpatient Prescriptions   Medication Sig Dispense Refill   • acetaminophen (TYLENOL) 500 MG tablet Take 500 mg by mouth Every 6 (Six) Hours As Needed for mild pain (1-3).     • allopurinol (ZYLOPRIM) 100 MG tablet Take 100 mg by mouth Daily.     • ALPRAZolam (XANAX) 0.25 MG tablet Take 0.25 mg by mouth 3 (Three) Times a Day As Needed for anxiety (PER PT WIFE, MORTON NOT TAKEN IN 3 MONTHS, WILL GIVE IN RARE OCCASSIONS (PANIC ATTACK)).     • amiodarone (PACERONE) 200 MG tablet Take 200 mg by mouth Daily.     • ANORO ELLIPTA 62.5-25 MCG/INH aerosol powder  TAKE 1 INHALATION D  5   • BIDIL 20-37.5 MG per tablet TK 1/2 T PO BID  4   • carvedilol (COREG) 12.5 MG tablet TAKE ONE AND ONE-HALF TABLETS BY MOUTH TWICE DAILY AS DIRECTED     • Cholecalciferol (VITAMIN D3) 5000 UNITS capsule capsule Take 5,000 Units by mouth Daily.     • diphenhydrAMINE (BENADRYL) 50 MG capsule Take 25 mg by mouth At Night As Needed for itching. 2 TABLETS     • donepezil (ARICEPT) 10 MG tablet Take 10 mg by mouth Every Night.     • gabapentin (NEURONTIN) 100 MG capsule TAKE 1 CAPSULE BY MOUTH TWICE DAILY 180 capsule 3   • insulin NPH (HumuLIN,NovoLIN) 100 UNIT/ML injection  Inject 20 Units under the skin 2 (Two) Times a Day Before Meals.     • levothyroxine (SYNTHROID, LEVOTHROID) 100 MCG tablet Take 100 mcg by mouth.     • memantine (NAMENDA) 10 MG tablet Take 1 tablet by mouth Daily. 30 tablet 3   • Multiple Vitamin (MULTIVITAMIN) tablet Take 1 tablet by mouth Daily.     • predniSONE (DELTASONE) 20 MG tablet Take 1 tablet by mouth 2 (Two) Times a Day. 6 tablet 0   • rosuvastatin (CRESTOR) 10 MG tablet Take 10 mg by mouth Daily.     • spironolactone (ALDACTONE) 25 MG tablet Take 25 mg by mouth Daily.     • Testosterone Cypionate (DEPOTESTOTERONE CYPIONATE) 200 MG/ML injection Inject 200 mg into the shoulder, thigh, or buttocks Every 21 (Twenty-One) Days. DUE 2/27/17     • torsemide (DEMADEX) 20 MG tablet Take 1 tablet by mouth Daily. 20MG TABLETS, TAKES 80MG BID     • vitamin B-12 (CYANOCOBALAMIN) 1000 MCG tablet Take 1,000 mcg by mouth Daily.     • aspirin 81 MG EC tablet Take  by mouth daily.       No current facility-administered medications for this encounter.        --------------------------------------------------------------------------------  Assessment/Plan      Anesthesia Evaluation     Patient summary reviewed and Nursing notes reviewed   NPO Solid Status: > 8 hours  NPO Liquid Status: > 8 hours     Airway   Mallampati: III  TM distance: >3 FB  Neck ROM: full  no difficulty expected  Dental - normal exam     Pulmonary     breath sounds clear to auscultation  (+) COPD, sleep apnea,   Cardiovascular - normal exam  Exercise tolerance: good (4-7 METS)    Rhythm: regular  Rate: normal    (+) hypertension, valvular problems/murmurs, CAD, CABG, dysrhythmias, CHF, hyperlipidemia      Neuro/Psych  (+) CVA, dementia,  abnormal gait,   GI/Hepatic/Renal/Endo    (+)  diabetes mellitus well controlled, hypothyroidism,     Musculoskeletal (-) normal exam    (+) back pain,   Abdominal  - normal exam   Substance History - negative use     OB/GYN          Other   (+) arthritis                                         Diagnosis and Plan    Treatment Plan  ASA 3   Patient has had previous injection/procedure with 50-75% improvement.   Procedures: Lumbar Epidural Steroid Injection(LESI), With fluoroscopy,       Anesthetic plan and risks discussed with patient.          Diagnosis     * Lumbar spinal stenosis [M48.061]     * Lumbar radicular pain [M54.16]

## 2018-01-11 NOTE — ANESTHESIA PROCEDURE NOTES
PAIN Epidural block    Patient location during procedure: pain clinic  Indication:procedure for pain  Performed By  Anesthesiologist: BALAJI SEQUEIRA  Preanesthetic Checklist  Completed: patient identified, site marked, surgical consent, pre-op evaluation, timeout performed, IV checked, risks and benefits discussed and monitors and equipment checked  Additional Notes  LSS/LRAD- NO DYE DUE TO CRI  Prep:  Pt Position:prone  Sterile Tech:cap, gloves, mask and sterile barrier  Prep:chlorhexidine gluconate and isopropyl alcohol  Monitoring:blood pressure monitoring, continuous pulse oximetry and EKG  Procedure:  Sedation: no   Approach:midline  Guidance: fluoroscopy  Location:lumbar  Level:4-5  Needle Type:Tuohy  Needle Gauge:20  Aspiration:negative  Medications:  Depomedrol:80  Preservative Free Saline:3mL    Post Assessment:  Dressing:secured with tape  Pt Tolerance:patient tolerated the procedure well with no apparent complications  Complications:no

## 2018-01-15 ENCOUNTER — DOCUMENTATION (OUTPATIENT)
Dept: PHYSICAL THERAPY | Facility: HOSPITAL | Age: 78
End: 2018-01-15

## 2018-01-15 DIAGNOSIS — Z74.09 IMPAIRED FUNCTIONAL MOBILITY, BALANCE, AND ENDURANCE: Primary | ICD-10-CM

## 2018-01-15 DIAGNOSIS — R26.2 DIFFICULTY WALKING: ICD-10-CM

## 2018-01-15 NOTE — THERAPY DISCHARGE NOTE
Outpatient Physical Therapy Discharge Summary         Patient Name: Eugenio Vargas  : 1940  MRN: [id number]    Today's Date: 1/15/2018    Visit Dx:    ICD-10-CM ICD-9-CM   1. Impaired functional mobility, balance, and endurance Z74.09 V49.89   2. Difficulty walking R26.2 719.7             PT OP Goals       01/15/18 1100       PT Short Term Goals    STG Date to Achieve 17  -CN     STG 1 Pt will report 50% reduction in symptoms indicating increased safety with ADLs.   -CN     STG 1 Progress Not Met  -CN     STG 1 Progress Comments Pt with slight symptom improvement, however not significant amount by the time of self D/C.   -CN     STG 2 Pt will be independent and compliant with HEP and symptom management including ROM, flexibility and core strengthening program in order to increase stability and minimize stress on affected tissues.    -CN     STG 2 Progress Met  -CN     Long Term Goals    LTG Date to Achieve 17  -CN     LTG 1 Patient will demonstrate improved BLE strength to grossly 4/5 for improved function and ease with performing sit to stands and stair negotiation.  -CN     LTG 1 Progress Not Met  -CN     LTG 1 Progress Comments Improved functional strength, however did not retest since evaluation.   -CN     LTG 2 Patient will demonstrate improved COTTER Balance score to at least 48/56 for improved function and safety with ADLs.  -CN     LTG 2 Progress Not Met  -CN     LTG 2 Progress Comments Pt self D/C, did not reassess since evaluation.   -CN     LTG 3 Pt will report no falls since onset of treatment.   -CN     LTG 3 Progress Met  -CN       User Key  (r) = Recorded By, (t) = Taken By, (c) = Cosigned By    Initials Name Provider Type    JORDAN Tolentino, PT Physical Therapist          OP PT Discharge Summary  Date of Discharge: 01/15/18  Reason for Discharge: other (comment) (Pt self D/C from PT)  Outcomes Achieved: Patient able to partially acheive established goals  Discharge  Destination: Home with home program  Discharge Instructions: Pt attended 3 skilled therapy sessions for treatment of low back pain as well as balance impairments. Pt with improved functional strength and reports compliance with HEP. Pt self D/C from PT to independent HEP.       Time Calculation:                    Lola Tolentino, PT  1/15/2018

## 2018-01-31 ENCOUNTER — TRANSCRIBE ORDERS (OUTPATIENT)
Dept: ADMINISTRATIVE | Facility: HOSPITAL | Age: 78
End: 2018-01-31

## 2018-01-31 ENCOUNTER — LAB (OUTPATIENT)
Dept: LAB | Facility: HOSPITAL | Age: 78
End: 2018-01-31

## 2018-01-31 DIAGNOSIS — E11.9 DIABETES MELLITUS WITHOUT COMPLICATION (HCC): Primary | ICD-10-CM

## 2018-01-31 DIAGNOSIS — E78.5 HYPERLIPIDEMIA, UNSPECIFIED HYPERLIPIDEMIA TYPE: ICD-10-CM

## 2018-01-31 DIAGNOSIS — I10 ESSENTIAL HYPERTENSION, MALIGNANT: ICD-10-CM

## 2018-01-31 DIAGNOSIS — I51.9 MYXEDEMA HEART DISEASE: ICD-10-CM

## 2018-01-31 DIAGNOSIS — M10.9 PODAGRA: ICD-10-CM

## 2018-01-31 DIAGNOSIS — E03.9 MYXEDEMA HEART DISEASE: ICD-10-CM

## 2018-01-31 DIAGNOSIS — E11.9 DIABETES MELLITUS WITHOUT COMPLICATION (HCC): ICD-10-CM

## 2018-01-31 LAB
ALBUMIN SERPL-MCNC: 3.9 G/DL (ref 3.5–5.2)
ALBUMIN/GLOB SERPL: 1.1 G/DL
ALP SERPL-CCNC: 81 U/L (ref 39–117)
ALT SERPL W P-5'-P-CCNC: 45 U/L (ref 1–41)
ANION GAP SERPL CALCULATED.3IONS-SCNC: 13 MMOL/L
AST SERPL-CCNC: 50 U/L (ref 1–40)
BACTERIA UR QL AUTO: NORMAL /HPF
BILIRUB SERPL-MCNC: 1 MG/DL (ref 0.1–1.2)
BILIRUB UR QL STRIP: NEGATIVE
BUN BLD-MCNC: 115 MG/DL (ref 8–23)
BUN/CREAT SERPL: 40.2 (ref 7–25)
CALCIUM SPEC-SCNC: 9.9 MG/DL (ref 8.6–10.5)
CHLORIDE SERPL-SCNC: 89 MMOL/L (ref 98–107)
CHOLEST SERPL-MCNC: 87 MG/DL (ref 0–200)
CLARITY UR: CLEAR
CO2 SERPL-SCNC: 33 MMOL/L (ref 22–29)
COLOR UR: YELLOW
CREAT BLD-MCNC: 2.86 MG/DL (ref 0.76–1.27)
GFR SERPL CREATININE-BSD FRML MDRD: 22 ML/MIN/1.73
GLOBULIN UR ELPH-MCNC: 3.5 GM/DL
GLUCOSE BLD-MCNC: 208 MG/DL (ref 65–99)
GLUCOSE UR STRIP-MCNC: NEGATIVE MG/DL
HBA1C MFR BLD: 8.5 % (ref 4.8–5.6)
HDLC SERPL-MCNC: 44 MG/DL (ref 40–60)
HGB UR QL STRIP.AUTO: NEGATIVE
HYALINE CASTS UR QL AUTO: NORMAL /LPF
KETONES UR QL STRIP: NEGATIVE
LDLC SERPL CALC-MCNC: 31 MG/DL (ref 0–100)
LDLC/HDLC SERPL: 0.7 {RATIO}
LEUKOCYTE ESTERASE UR QL STRIP.AUTO: NEGATIVE
NITRITE UR QL STRIP: NEGATIVE
PH UR STRIP.AUTO: 6.5 [PH] (ref 5–8)
POTASSIUM BLD-SCNC: 4.3 MMOL/L (ref 3.5–5.2)
PROT SERPL-MCNC: 7.4 G/DL (ref 6–8.5)
PROT UR QL STRIP: ABNORMAL
RBC # UR: NORMAL /HPF
REF LAB TEST METHOD: NORMAL
SODIUM BLD-SCNC: 135 MMOL/L (ref 136–145)
SP GR UR STRIP: 1.01 (ref 1–1.03)
SQUAMOUS #/AREA URNS HPF: NORMAL /HPF
TRIGL SERPL-MCNC: 60 MG/DL (ref 0–150)
TSH SERPL DL<=0.05 MIU/L-ACNC: 2.98 MIU/ML (ref 0.27–4.2)
URATE SERPL-MCNC: 6.4 MG/DL (ref 3.4–7)
UROBILINOGEN UR QL STRIP: ABNORMAL
VLDLC SERPL-MCNC: 12 MG/DL (ref 5–40)
WBC UR QL AUTO: NORMAL /HPF

## 2018-01-31 PROCEDURE — 84443 ASSAY THYROID STIM HORMONE: CPT | Performed by: INTERNAL MEDICINE

## 2018-01-31 PROCEDURE — 83036 HEMOGLOBIN GLYCOSYLATED A1C: CPT | Performed by: INTERNAL MEDICINE

## 2018-01-31 PROCEDURE — 84550 ASSAY OF BLOOD/URIC ACID: CPT | Performed by: INTERNAL MEDICINE

## 2018-01-31 PROCEDURE — 80053 COMPREHEN METABOLIC PANEL: CPT | Performed by: INTERNAL MEDICINE

## 2018-01-31 PROCEDURE — 36415 COLL VENOUS BLD VENIPUNCTURE: CPT

## 2018-01-31 PROCEDURE — 81001 URINALYSIS AUTO W/SCOPE: CPT | Performed by: INTERNAL MEDICINE

## 2018-01-31 PROCEDURE — 80061 LIPID PANEL: CPT | Performed by: INTERNAL MEDICINE
